# Patient Record
Sex: MALE | Race: WHITE | NOT HISPANIC OR LATINO | Employment: OTHER | URBAN - METROPOLITAN AREA
[De-identification: names, ages, dates, MRNs, and addresses within clinical notes are randomized per-mention and may not be internally consistent; named-entity substitution may affect disease eponyms.]

---

## 2017-01-10 ENCOUNTER — HISTORICAL (OUTPATIENT)
Dept: ADMINISTRATIVE | Facility: HOSPITAL | Age: 77
End: 2017-01-10

## 2017-02-14 ENCOUNTER — HISTORICAL (OUTPATIENT)
Dept: SURGERY | Facility: HOSPITAL | Age: 77
End: 2017-02-14

## 2017-03-17 ENCOUNTER — HISTORICAL (OUTPATIENT)
Dept: ADMINISTRATIVE | Facility: HOSPITAL | Age: 77
End: 2017-03-17

## 2023-02-28 ENCOUNTER — HOSPITAL ENCOUNTER (OUTPATIENT)
Dept: CARDIOLOGY | Facility: HOSPITAL | Age: 83
Discharge: HOME | End: 2023-02-28
Attending: INTERNAL MEDICINE
Payer: MEDICARE

## 2023-02-28 ENCOUNTER — ANESTHESIA (OUTPATIENT)
Dept: CARDIOLOGY | Facility: HOSPITAL | Age: 83
End: 2023-02-28
Payer: MEDICARE

## 2023-02-28 ENCOUNTER — ANESTHESIA EVENT (OUTPATIENT)
Dept: CARDIOLOGY | Facility: HOSPITAL | Age: 83
End: 2023-02-28
Payer: MEDICARE

## 2023-02-28 VITALS
OXYGEN SATURATION: 98 % | DIASTOLIC BLOOD PRESSURE: 85 MMHG | SYSTOLIC BLOOD PRESSURE: 189 MMHG | BODY MASS INDEX: 25.34 KG/M2 | RESPIRATION RATE: 15 BRPM | HEART RATE: 52 BPM | WEIGHT: 181 LBS | HEIGHT: 71 IN

## 2023-02-28 DIAGNOSIS — I48.0 PAROXYSMAL ATRIAL FIBRILLATION: Primary | ICD-10-CM

## 2023-02-28 DIAGNOSIS — I49.5 SSS (SICK SINUS SYNDROME): ICD-10-CM

## 2023-02-28 DIAGNOSIS — I48.0 PAF (PAROXYSMAL ATRIAL FIBRILLATION): ICD-10-CM

## 2023-02-28 DIAGNOSIS — I48.0 PAROXYSMAL ATRIAL FIBRILLATION: ICD-10-CM

## 2023-02-28 LAB — BSA FOR ECHO PROCEDURE: 2.03 M2

## 2023-02-28 PROCEDURE — 93312 ECHO TRANSESOPHAGEAL: CPT

## 2023-02-28 PROCEDURE — 93005 ELECTROCARDIOGRAM TRACING: CPT

## 2023-02-28 PROCEDURE — 25000003 PHARM REV CODE 250

## 2023-02-28 PROCEDURE — 93010 ELECTROCARDIOGRAM REPORT: CPT | Mod: ,,, | Performed by: INTERNAL MEDICINE

## 2023-02-28 PROCEDURE — 37000008 HC ANESTHESIA 1ST 15 MINUTES

## 2023-02-28 PROCEDURE — 93010 EKG 12-LEAD: ICD-10-PCS | Mod: ,,, | Performed by: INTERNAL MEDICINE

## 2023-02-28 PROCEDURE — 37000009 HC ANESTHESIA EA ADD 15 MINS

## 2023-02-28 PROCEDURE — 63600175 PHARM REV CODE 636 W HCPCS

## 2023-02-28 RX ORDER — EMPAGLIFLOZIN 10 MG/1
10 TABLET, FILM COATED ORAL NIGHTLY
COMMUNITY
Start: 2023-01-19 | End: 2023-08-24 | Stop reason: SDUPTHER

## 2023-02-28 RX ORDER — ATORVASTATIN CALCIUM 40 MG/1
40 TABLET, FILM COATED ORAL NIGHTLY
COMMUNITY
End: 2023-08-15 | Stop reason: ALTCHOICE

## 2023-02-28 RX ORDER — PRAMIPEXOLE DIHYDROCHLORIDE 0.5 MG/1
.5-1.5 TABLET ORAL NIGHTLY
COMMUNITY
Start: 2023-02-11 | End: 2023-06-29 | Stop reason: SDUPTHER

## 2023-02-28 RX ORDER — LIDOCAINE HYDROCHLORIDE 20 MG/ML
INJECTION, SOLUTION EPIDURAL; INFILTRATION; INTRACAUDAL; PERINEURAL
Status: DISCONTINUED | OUTPATIENT
Start: 2023-02-28 | End: 2023-02-28

## 2023-02-28 RX ORDER — ALPRAZOLAM 0.5 MG/1
0.5 TABLET ORAL NIGHTLY PRN
COMMUNITY
Start: 2023-02-08 | End: 2023-06-19 | Stop reason: SDUPTHER

## 2023-02-28 RX ORDER — PROPOFOL 10 MG/ML
VIAL (ML) INTRAVENOUS
Status: DISCONTINUED | OUTPATIENT
Start: 2023-02-28 | End: 2023-02-28

## 2023-02-28 RX ADMIN — PROPOFOL 40 MG: 10 INJECTION, EMULSION INTRAVENOUS at 01:02

## 2023-02-28 RX ADMIN — PROPOFOL 20 MG: 10 INJECTION, EMULSION INTRAVENOUS at 01:02

## 2023-02-28 RX ADMIN — LIDOCAINE HYDROCHLORIDE 80 MG: 20 INJECTION, SOLUTION EPIDURAL; INFILTRATION; INTRACAUDAL; PERINEURAL at 01:02

## 2023-02-28 RX ADMIN — SODIUM CHLORIDE, SODIUM GLUCONATE, SODIUM ACETATE, POTASSIUM CHLORIDE AND MAGNESIUM CHLORIDE: 526; 502; 368; 37; 30 INJECTION, SOLUTION INTRAVENOUS at 12:02

## 2023-02-28 RX ADMIN — PROPOFOL 50 MG: 10 INJECTION, EMULSION INTRAVENOUS at 01:02

## 2023-02-28 NOTE — DISCHARGE SUMMARY
[facility] - Cath Lab Services  Discharge Note  Short Stay    Transesophageal echo (DELORIS)      OUTCOME: Patient tolerated treatment/procedure well without complication and is now ready for discharge.    DISPOSITION: Home or Self Care    FINAL DIAGNOSIS:  PAF    FOLLOWUP: In clinic    DISCHARGE INSTRUCTIONS:  No discharge procedures on file.     TIME SPENT ON DISCHARGE: 15 minutes

## 2023-02-28 NOTE — ANESTHESIA POSTPROCEDURE EVALUATION
Anesthesia Post Evaluation    Patient: Rico Nagel    Procedure(s) Performed: * No procedures listed *    Final Anesthesia Type: general      Patient location during evaluation: PACU  Patient participation: Yes- Able to Participate  Level of consciousness: awake and alert  Post-procedure vital signs: reviewed and stable  Pain management: adequate  Airway patency: patent      Anesthetic complications: no      Cardiovascular status: hemodynamically stable  Respiratory status: unassisted  Hydration status: euvolemic  Follow-up not needed.          Vitals Value Taken Time   /102 02/28/23 1331   Temp 36.7 02/28/23 1342   Pulse 56 02/28/23 1341   Resp 18 02/28/23 1341   SpO2 97 % 02/28/23 1341   Vitals shown include unvalidated device data.      No case tracking events are documented in the log.      Pain/Mica Score: No data recorded

## 2023-02-28 NOTE — ANESTHESIA PREPROCEDURE EVALUATION
02/28/2023  Rico Nagel is a 82 y.o., male.      Pre-op Assessment    I have reviewed the Patient Summary Reports.     I have reviewed the Nursing Notes. I have reviewed the NPO Status.   I have reviewed the Medications.     Review of Systems      Physical Exam  General: Well nourished, Cooperative, Alert and Oriented    Airway:  Mallampati: II   Mouth Opening: Normal  TM Distance: Normal  Tongue: Normal  Neck ROM: Normal ROM    Dental:  Intact        Anesthesia Plan  Type of Anesthesia, risks & benefits discussed:    Anesthesia Type: Gen Natural Airway  Intra-op Monitoring Plan: Standard ASA Monitors  Post Op Pain Control Plan: multimodal analgesia  Induction:  IV  Informed Consent: Informed consent signed with the Patient and all parties understand the risks and agree with anesthesia plan.  All questions answered. Patient consented to blood products? Yes  ASA Score: 3  Day of Surgery Review of History & Physical: H&P Update referred to the surgeon/provider.    Ready For Surgery From Anesthesia Perspective.     .

## 2023-02-28 NOTE — TRANSFER OF CARE
"Anesthesia Transfer of Care Note    Patient: Rico Nagel    Procedure(s) Performed: * No procedures listed *    Patient location: Cath Lab    Anesthesia Type: general    Transport from OR: Transported from OR on room air with adequate spontaneous ventilation    Post pain: adequate analgesia    Post assessment: no apparent anesthetic complications    Post vital signs: stable    Level of consciousness: awake and alert    Nausea/Vomiting: no nausea/vomiting    Complications: none    Transfer of care protocol was followed      Last vitals:   Visit Vitals  Ht 5' 11" (1.803 m)   Wt 82.1 kg (181 lb)   BMI 25.24 kg/m²     "

## 2023-03-23 ENCOUNTER — ANESTHESIA EVENT (OUTPATIENT)
Dept: CARDIOLOGY | Facility: HOSPITAL | Age: 83
DRG: 274 | End: 2023-03-23
Payer: MEDICARE

## 2023-03-23 RX ORDER — LANOLIN ALCOHOL/MO/W.PET/CERES
1 CREAM (GRAM) TOPICAL
COMMUNITY

## 2023-03-23 RX ORDER — PANTOPRAZOLE SODIUM 40 MG/1
40 TABLET, DELAYED RELEASE ORAL DAILY
Status: ON HOLD | COMMUNITY
Start: 2023-02-27 | End: 2023-03-29 | Stop reason: SDUPTHER

## 2023-03-27 ENCOUNTER — HOSPITAL ENCOUNTER (OUTPATIENT)
Dept: RADIOLOGY | Facility: HOSPITAL | Age: 83
Discharge: HOME | DRG: 274 | End: 2023-03-27
Attending: INTERNAL MEDICINE
Payer: MEDICARE

## 2023-03-27 DIAGNOSIS — Z00.6 EXAMINATION OF PARTICIPANT IN CLINICAL TRIAL: ICD-10-CM

## 2023-03-27 DIAGNOSIS — I48.0 PAROXYSMAL ATRIAL FIBRILLATION: ICD-10-CM

## 2023-03-27 PROCEDURE — 71046 X-RAY EXAM CHEST 2 VIEWS: CPT | Mod: TC

## 2023-03-27 PROCEDURE — 86923 COMPATIBILITY TEST ELECTRIC: CPT | Mod: 91 | Performed by: INTERNAL MEDICINE

## 2023-03-27 NOTE — ANESTHESIA PREPROCEDURE EVALUATION
03/27/2023  Rico Nagel is a 82 y.o., male       Pre-op Assessment    I have reviewed the Patient Summary Reports.     I have reviewed the Nursing Notes. I have reviewed the NPO Status.   I have reviewed the Medications.     Review of Systems  Anesthesia Hx:  No problems with previous Anesthesia    Social:  Non-Smoker    Cardiovascular:   CAD  Dysrhythmias atrial fibrillation    Endocrine:   Diabetes        Physical Exam  General: Well nourished, Cooperative, Alert and Oriented    Airway:  Mallampati: II / II  Mouth Opening: Normal  TM Distance: Normal  Tongue: Normal  Neck ROM: Extension Decreased    Dental:  Intact, Partial Dentures  Small lower molar bridge      Anesthesia Plan  Type of Anesthesia, risks & benefits discussed:    Anesthesia Type: Gen ETT  Intra-op Monitoring Plan: Standard ASA Monitors and Art Line  Post Op Pain Control Plan: multimodal analgesia and IV/PO Opioids PRN  Airway Plan: Direct, Post-Induction  Informed Consent: Informed consent signed with the Patient and all parties understand the risks and agree with anesthesia plan.  All questions answered. Patient consented to blood products? Yes  ASA Score: 3  Day of Surgery Review of History & Physical: H&P Update referred to the surgeon/provider.    Ready For Surgery From Anesthesia Perspective.     .

## 2023-03-28 ENCOUNTER — HOSPITAL ENCOUNTER (INPATIENT)
Facility: HOSPITAL | Age: 83
LOS: 1 days | Discharge: HOME | DRG: 274 | End: 2023-03-29
Attending: INTERNAL MEDICINE | Admitting: INTERNAL MEDICINE
Payer: MEDICARE

## 2023-03-28 ENCOUNTER — ANESTHESIA (OUTPATIENT)
Dept: CARDIOLOGY | Facility: HOSPITAL | Age: 83
DRG: 274 | End: 2023-03-28
Payer: MEDICARE

## 2023-03-28 DIAGNOSIS — I48.91 A-FIB: ICD-10-CM

## 2023-03-28 DIAGNOSIS — I48.91 ATRIAL FIBRILLATION: ICD-10-CM

## 2023-03-28 DIAGNOSIS — I48.0 PAF (PAROXYSMAL ATRIAL FIBRILLATION): Primary | ICD-10-CM

## 2023-03-28 DIAGNOSIS — I48.0 PAROXYSMAL ATRIAL FIBRILLATION: ICD-10-CM

## 2023-03-28 DIAGNOSIS — I48.20 ATRIAL FIBRILLATION, CHRONIC: ICD-10-CM

## 2023-03-28 DIAGNOSIS — R00.1 BRADYCARDIA: ICD-10-CM

## 2023-03-28 LAB
BSA FOR ECHO PROCEDURE: 2.04 M2
GLUCOSE SERPL-MCNC: 124 MG/DL (ref 70–110)
POCT GLUCOSE: 124 MG/DL (ref 70–110)
POCT GLUCOSE: 128 MG/DL (ref 70–110)

## 2023-03-28 PROCEDURE — 93010 ELECTROCARDIOGRAM REPORT: CPT | Mod: ,,, | Performed by: INTERNAL MEDICINE

## 2023-03-28 PROCEDURE — C1894 INTRO/SHEATH, NON-LASER: HCPCS | Performed by: INTERNAL MEDICINE

## 2023-03-28 PROCEDURE — 37000008 HC ANESTHESIA 1ST 15 MINUTES: Performed by: INTERNAL MEDICINE

## 2023-03-28 PROCEDURE — 21400001 HC TELEMETRY ROOM

## 2023-03-28 PROCEDURE — 63600175 PHARM REV CODE 636 W HCPCS: Performed by: INTERNAL MEDICINE

## 2023-03-28 PROCEDURE — 63600175 PHARM REV CODE 636 W HCPCS: Performed by: ANESTHESIOLOGY

## 2023-03-28 PROCEDURE — 37000009 HC ANESTHESIA EA ADD 15 MINS: Performed by: INTERNAL MEDICINE

## 2023-03-28 PROCEDURE — 25000003 PHARM REV CODE 250: Performed by: INTERNAL MEDICINE

## 2023-03-28 PROCEDURE — 11000001 HC ACUTE MED/SURG PRIVATE ROOM

## 2023-03-28 PROCEDURE — 33340 PERQ CLSR TCAT L ATR APNDGE: CPT | Mod: Q0 | Performed by: INTERNAL MEDICINE

## 2023-03-28 PROCEDURE — 25500020 PHARM REV CODE 255: Performed by: INTERNAL MEDICINE

## 2023-03-28 PROCEDURE — 63600175 PHARM REV CODE 636 W HCPCS: Performed by: STUDENT IN AN ORGANIZED HEALTH CARE EDUCATION/TRAINING PROGRAM

## 2023-03-28 PROCEDURE — 27201423 OPTIME MED/SURG SUP & DEVICES STERILE SUPPLY: Performed by: INTERNAL MEDICINE

## 2023-03-28 PROCEDURE — 25000003 PHARM REV CODE 250: Performed by: STUDENT IN AN ORGANIZED HEALTH CARE EDUCATION/TRAINING PROGRAM

## 2023-03-28 PROCEDURE — 93005 ELECTROCARDIOGRAM TRACING: CPT

## 2023-03-28 PROCEDURE — 63600175 PHARM REV CODE 636 W HCPCS: Performed by: NURSE PRACTITIONER

## 2023-03-28 PROCEDURE — 93010 EKG 12-LEAD: ICD-10-PCS | Mod: ,,, | Performed by: INTERNAL MEDICINE

## 2023-03-28 DEVICE — LEFT ATRIAL APPENDAGE OCCLUDER
Type: IMPLANTABLE DEVICE | Site: HEART | Status: FUNCTIONAL
Brand: AMPLATZER™ AMULET™

## 2023-03-28 RX ORDER — HEPARIN SODIUM 10000 [USP'U]/100ML
INJECTION, SOLUTION INTRAVENOUS CONTINUOUS PRN
Status: DISCONTINUED | OUTPATIENT
Start: 2023-03-28 | End: 2023-03-28

## 2023-03-28 RX ORDER — CLOPIDOGREL BISULFATE 75 MG/1
75 TABLET ORAL DAILY
Status: DISCONTINUED | OUTPATIENT
Start: 2023-03-29 | End: 2023-03-29 | Stop reason: HOSPADM

## 2023-03-28 RX ORDER — DEXAMETHASONE SODIUM PHOSPHATE 4 MG/ML
INJECTION, SOLUTION INTRA-ARTICULAR; INTRALESIONAL; INTRAMUSCULAR; INTRAVENOUS; SOFT TISSUE
Status: DISCONTINUED | OUTPATIENT
Start: 2023-03-28 | End: 2023-03-28

## 2023-03-28 RX ORDER — HYDRALAZINE HYDROCHLORIDE 20 MG/ML
10 INJECTION INTRAMUSCULAR; INTRAVENOUS
Status: DISCONTINUED | OUTPATIENT
Start: 2023-03-28 | End: 2023-03-29 | Stop reason: HOSPADM

## 2023-03-28 RX ORDER — CEFAZOLIN SODIUM 1 G/3ML
1 INJECTION, POWDER, FOR SOLUTION INTRAMUSCULAR; INTRAVENOUS
Status: COMPLETED | OUTPATIENT
Start: 2023-03-28 | End: 2023-03-28

## 2023-03-28 RX ORDER — LIDOCAINE HYDROCHLORIDE 20 MG/ML
INJECTION INTRAVENOUS
Status: DISCONTINUED | OUTPATIENT
Start: 2023-03-28 | End: 2023-03-28

## 2023-03-28 RX ORDER — FENTANYL CITRATE 50 UG/ML
INJECTION, SOLUTION INTRAMUSCULAR; INTRAVENOUS
Status: DISCONTINUED | OUTPATIENT
Start: 2023-03-28 | End: 2023-03-28

## 2023-03-28 RX ORDER — ASPIRIN 81 MG/1
81 TABLET ORAL DAILY
Status: DISCONTINUED | OUTPATIENT
Start: 2023-03-29 | End: 2023-03-29 | Stop reason: HOSPADM

## 2023-03-28 RX ORDER — LIDOCAINE HYDROCHLORIDE 10 MG/ML
INJECTION INFILTRATION; PERINEURAL
Status: DISCONTINUED | OUTPATIENT
Start: 2023-03-28 | End: 2023-03-28

## 2023-03-28 RX ORDER — CEFAZOLIN SODIUM 1 G/3ML
1 INJECTION, POWDER, FOR SOLUTION INTRAMUSCULAR; INTRAVENOUS
Status: DISCONTINUED | OUTPATIENT
Start: 2023-03-28 | End: 2023-03-28

## 2023-03-28 RX ORDER — HYDROCODONE BITARTRATE AND ACETAMINOPHEN 5; 325 MG/1; MG/1
1 TABLET ORAL EVERY 4 HOURS PRN
Status: DISCONTINUED | OUTPATIENT
Start: 2023-03-28 | End: 2023-03-29 | Stop reason: HOSPADM

## 2023-03-28 RX ORDER — ACETAMINOPHEN 325 MG/1
650 TABLET ORAL EVERY 4 HOURS PRN
Status: DISCONTINUED | OUTPATIENT
Start: 2023-03-28 | End: 2023-03-29 | Stop reason: HOSPADM

## 2023-03-28 RX ORDER — HEPARIN SODIUM 1000 [USP'U]/ML
INJECTION, SOLUTION INTRAVENOUS; SUBCUTANEOUS
Status: DISCONTINUED | OUTPATIENT
Start: 2023-03-28 | End: 2023-03-28

## 2023-03-28 RX ORDER — HYDRALAZINE HYDROCHLORIDE 20 MG/ML
10 INJECTION INTRAMUSCULAR; INTRAVENOUS ONCE
Status: COMPLETED | OUTPATIENT
Start: 2023-03-28 | End: 2023-03-28

## 2023-03-28 RX ORDER — PROTAMINE SULFATE 10 MG/ML
INJECTION, SOLUTION INTRAVENOUS
Status: DISCONTINUED | OUTPATIENT
Start: 2023-03-28 | End: 2023-03-28

## 2023-03-28 RX ORDER — MUPIROCIN 20 MG/G
OINTMENT TOPICAL DAILY
Status: DISCONTINUED | OUTPATIENT
Start: 2023-03-28 | End: 2023-03-29 | Stop reason: HOSPADM

## 2023-03-28 RX ORDER — MORPHINE SULFATE 4 MG/ML
2 INJECTION, SOLUTION INTRAMUSCULAR; INTRAVENOUS EVERY 4 HOURS PRN
Status: DISCONTINUED | OUTPATIENT
Start: 2023-03-28 | End: 2023-03-29 | Stop reason: HOSPADM

## 2023-03-28 RX ORDER — ALPRAZOLAM 0.5 MG/1
0.5 TABLET ORAL NIGHTLY PRN
Status: DISCONTINUED | OUTPATIENT
Start: 2023-03-28 | End: 2023-03-29 | Stop reason: HOSPADM

## 2023-03-28 RX ORDER — SODIUM CHLORIDE 0.9 % (FLUSH) 0.9 %
10 SYRINGE (ML) INJECTION
Status: DISCONTINUED | OUTPATIENT
Start: 2023-03-28 | End: 2023-03-28

## 2023-03-28 RX ORDER — ROCURONIUM BROMIDE 10 MG/ML
INJECTION, SOLUTION INTRAVENOUS
Status: DISCONTINUED | OUTPATIENT
Start: 2023-03-28 | End: 2023-03-28

## 2023-03-28 RX ORDER — SODIUM CHLORIDE 9 MG/ML
INJECTION, SOLUTION INTRAVENOUS ONCE
Status: DISCONTINUED | OUTPATIENT
Start: 2023-03-28 | End: 2023-03-28

## 2023-03-28 RX ORDER — PROPOFOL 10 MG/ML
VIAL (ML) INTRAVENOUS
Status: DISCONTINUED | OUTPATIENT
Start: 2023-03-28 | End: 2023-03-28

## 2023-03-28 RX ORDER — ATORVASTATIN CALCIUM 40 MG/1
40 TABLET, FILM COATED ORAL NIGHTLY
Status: DISCONTINUED | OUTPATIENT
Start: 2023-03-28 | End: 2023-03-29 | Stop reason: HOSPADM

## 2023-03-28 RX ORDER — DOXYCYCLINE HYCLATE 100 MG
100 TABLET ORAL 2 TIMES DAILY
Status: DISCONTINUED | OUTPATIENT
Start: 2023-03-29 | End: 2023-03-29 | Stop reason: HOSPADM

## 2023-03-28 RX ORDER — HYDRALAZINE HYDROCHLORIDE 20 MG/ML
INJECTION INTRAMUSCULAR; INTRAVENOUS
Status: DISPENSED
Start: 2023-03-28 | End: 2023-03-29

## 2023-03-28 RX ORDER — ONDANSETRON 2 MG/ML
INJECTION INTRAMUSCULAR; INTRAVENOUS
Status: DISCONTINUED | OUTPATIENT
Start: 2023-03-28 | End: 2023-03-28

## 2023-03-28 RX ADMIN — MUPIROCIN: 20 OINTMENT TOPICAL at 09:03

## 2023-03-28 RX ADMIN — SUGAMMADEX 200 MG: 100 INJECTION, SOLUTION INTRAVENOUS at 10:03

## 2023-03-28 RX ADMIN — SODIUM CHLORIDE, SODIUM GLUCONATE, SODIUM ACETATE, POTASSIUM CHLORIDE AND MAGNESIUM CHLORIDE: 526; 502; 368; 37; 30 INJECTION, SOLUTION INTRAVENOUS at 10:03

## 2023-03-28 RX ADMIN — HEPARIN SODIUM 1000 UNITS: 1000 INJECTION, SOLUTION INTRAVENOUS; SUBCUTANEOUS at 10:03

## 2023-03-28 RX ADMIN — ROCURONIUM BROMIDE 60 MG: 10 SOLUTION INTRAVENOUS at 09:03

## 2023-03-28 RX ADMIN — FENTANYL CITRATE 50 MCG: 50 INJECTION, SOLUTION INTRAMUSCULAR; INTRAVENOUS at 09:03

## 2023-03-28 RX ADMIN — PROTAMINE SULFATE 40 MG: 10 INJECTION, SOLUTION INTRAVENOUS at 10:03

## 2023-03-28 RX ADMIN — ATORVASTATIN CALCIUM 40 MG: 40 TABLET, FILM COATED ORAL at 09:03

## 2023-03-28 RX ADMIN — HYDRALAZINE HYDROCHLORIDE 10 MG: 20 INJECTION INTRAMUSCULAR; INTRAVENOUS at 12:03

## 2023-03-28 RX ADMIN — SODIUM CHLORIDE, SODIUM GLUCONATE, SODIUM ACETATE, POTASSIUM CHLORIDE AND MAGNESIUM CHLORIDE: 526; 502; 368; 37; 30 INJECTION, SOLUTION INTRAVENOUS at 09:03

## 2023-03-28 RX ADMIN — DEXAMETHASONE SODIUM PHOSPHATE 4 MG: 4 INJECTION, SOLUTION INTRA-ARTICULAR; INTRALESIONAL; INTRAMUSCULAR; INTRAVENOUS; SOFT TISSUE at 10:03

## 2023-03-28 RX ADMIN — ONDANSETRON 4 MG: 2 INJECTION INTRAMUSCULAR; INTRAVENOUS at 10:03

## 2023-03-28 RX ADMIN — CEFAZOLIN 2 G: 1 INJECTION, POWDER, FOR SOLUTION INTRAMUSCULAR; INTRAVENOUS at 09:03

## 2023-03-28 RX ADMIN — HEPARIN SODIUM 1000 UNITS/HR: 10000 INJECTION, SOLUTION INTRAVENOUS at 10:03

## 2023-03-28 RX ADMIN — LIDOCAINE HYDROCHLORIDE 80 MG: 20 INJECTION, SOLUTION INTRAVENOUS at 09:03

## 2023-03-28 RX ADMIN — HYDRALAZINE HYDROCHLORIDE 10 MG: 20 INJECTION INTRAMUSCULAR; INTRAVENOUS at 09:03

## 2023-03-28 RX ADMIN — PROPOFOL 160 MG: 10 INJECTION, EMULSION INTRAVENOUS at 09:03

## 2023-03-28 RX ADMIN — HYDRALAZINE HYDROCHLORIDE 10 MG: 20 INJECTION INTRAMUSCULAR; INTRAVENOUS at 01:03

## 2023-03-28 RX ADMIN — HEPARIN SODIUM 4000 UNITS: 1000 INJECTION, SOLUTION INTRAVENOUS; SUBCUTANEOUS at 10:03

## 2023-03-28 NOTE — ANESTHESIA PROCEDURE NOTES
Intubation    Date/Time: 3/28/2023 9:53 AM  Performed by: Vance Erickson CRNA  Authorized by: Mallorie Hendricks MD     Intubation:     Induction:  Intravenous    Intubated:  Postinduction    Mask Ventilation:  Easy mask    Attempts:  1    Attempted By:  CRNA    Method of Intubation:  Direct    Blade:  Odonnell 4    Laryngeal View Grade: Grade IIA - cords partially seen      Difficult Airway Encountered?: No      Complications:  None    Airway Device:  Oral endotracheal tube    Airway Device Size:  7.5    Style/Cuff Inflation:  Cuffed (inflated to minimal occlusive pressure)    Tube secured:  22    Secured at:  The lips    Placement Verified By:  Capnometry    Complicating Factors:  None    Findings Post-Intubation:  BS equal bilateral and atraumatic/condition of teeth unchanged

## 2023-03-28 NOTE — Clinical Note
DEVICE AMULET 22MM was inserted into the left atrial appendage. Patient sent in for K of 6.4 and chest pain

## 2023-03-28 NOTE — TRANSFER OF CARE
"Anesthesia Transfer of Care Note    Patient: Rico Nagel    Procedure(s) Performed: Procedure(s) (LRB):  Left atrial appendage closure device (N/A)  Transesophageal echo (DELORIS) intra-procedure log documentation (N/A)    Patient location: PACU    Anesthesia Type: general    Transport from OR: Transported from OR on room air with adequate spontaneous ventilation    Post pain: adequate analgesia    Post assessment: no apparent anesthetic complications    Post vital signs: stable    Level of consciousness: awake    Nausea/Vomiting: no nausea/vomiting    Complications: none    Transfer of care protocol was followed      Last vitals:   Visit Vitals  BP (!) 154/72 (BP Location: Left arm, Patient Position: Lying)   Pulse (!) 51   Temp 36.9 °C (98.5 °F) (Oral)   Resp 19   Ht 5' 11" (1.803 m)   Wt 83 kg (182 lb 15.7 oz)   SpO2 95%   BMI 25.52 kg/m²     "

## 2023-03-28 NOTE — Clinical Note
A venogram was performed in the [facility]. The vessel was injected via hand injection  with 18 mL of contrast.

## 2023-03-28 NOTE — Clinical Note
A venogram was performed in the [facility]. The vessel was injected via hand injection  with 17 mL of contrast.

## 2023-03-28 NOTE — ANESTHESIA PROCEDURE NOTES
Arterial    Diagnosis: afib    Patient location during procedure: done in OR  Procedure start time: 3/28/2023 9:54 AM  Timeout: 3/28/2023 9:53 AM  Procedure end time: 3/28/2023 9:54 AM    Staffing  Authorizing Provider: Mallorie Hendricks MD  Performing Provider: Vance Erickson CRNA    Anesthesiologist was present at the time of the procedure.    Preanesthetic Checklist  Completed: patient identified, IV checked, site marked, risks and benefits discussed, surgical consent, monitors and equipment checked, pre-op evaluation, timeout performed and anesthesia consent givenArterial  Skin Prep: chlorhexidine gluconate  Local Infiltration: none  Orientation: left  Location: radial    Catheter Size: 20 G  Catheter placement by Anatomical landmarks. Heme positive aspiration all ports. Insertion Attempts: 1  Assessment  Dressing: secured with tape and tegaderm  Patient: Tolerated well

## 2023-03-28 NOTE — ANESTHESIA PROCEDURE NOTES
Monitor DELORIS    Diagnosis: Atrial fibrillation  Patient location during procedure: OR    Staffing  Authorizing Provider: Mallorie Hendricks MD  Performing Provider: Mallorie Hendricks MD    Staffing  Anesthesiologist Present  Yes      After induction of general endotracheal anesthesia in the operating room, transesophageal echo probe was placed atraumatically and the esophagus.    1. Real-time echo guidance was used for evaluation of patency of the left atrial appendage, measurements and placement of a left atrial occluding device, ambulate.      2. Right ventricular function appears normal.  Patient is relatively hypovolemic as right atrium and right ventricle are hypovolemic.  Left ventricle has low normal ejection fraction.    3. No spontaneous echo contrast or thrombus are noted in the left atrial appendage, adequate measurements were determined.  Initially an ambulance device was deployed which was felt to have poor seeding and some superiorly noted Doppler flow.  This device was removed and a smaller device placed more distally into the left atrial appendage with good occlusion and no Doppler flow around the device.  A tug test was performed with no displacement of the device.      At the conclusion of the operation the transesophageal echo probe was removed atraumatically.

## 2023-03-29 VITALS
BODY MASS INDEX: 25.62 KG/M2 | WEIGHT: 183 LBS | SYSTOLIC BLOOD PRESSURE: 188 MMHG | RESPIRATION RATE: 18 BRPM | HEART RATE: 61 BPM | TEMPERATURE: 98 F | HEIGHT: 71 IN | OXYGEN SATURATION: 97 % | DIASTOLIC BLOOD PRESSURE: 69 MMHG

## 2023-03-29 LAB
ANION GAP SERPL CALC-SCNC: 6 MEQ/L
BASOPHILS # BLD AUTO: 0.01 X10(3)/MCL (ref 0–0.2)
BASOPHILS NFR BLD AUTO: 0.1 %
BSA FOR ECHO PROCEDURE: 2.04 M2
BUN SERPL-MCNC: 23.7 MG/DL (ref 8.4–25.7)
CALCIUM SERPL-MCNC: 8.8 MG/DL (ref 8.8–10)
CHLORIDE SERPL-SCNC: 107 MMOL/L (ref 98–107)
CO2 SERPL-SCNC: 29 MMOL/L (ref 23–31)
CREAT SERPL-MCNC: 0.76 MG/DL (ref 0.73–1.18)
CREAT/UREA NIT SERPL: 31
EOSINOPHIL # BLD AUTO: 0.01 X10(3)/MCL (ref 0–0.9)
EOSINOPHIL NFR BLD AUTO: 0.1 %
ERYTHROCYTE [DISTWIDTH] IN BLOOD BY AUTOMATED COUNT: 15.1 % (ref 11.5–17)
GFR SERPLBLD CREATININE-BSD FMLA CKD-EPI: >60 MLS/MIN/1.73/M2
GLUCOSE SERPL-MCNC: 183 MG/DL (ref 82–115)
HCT VFR BLD AUTO: 42 % (ref 42–52)
HGB BLD-MCNC: 13.6 G/DL (ref 14–18)
IMM GRANULOCYTES # BLD AUTO: 0.03 X10(3)/MCL (ref 0–0.04)
IMM GRANULOCYTES NFR BLD AUTO: 0.3 %
LYMPHOCYTES # BLD AUTO: 1.15 X10(3)/MCL (ref 0.6–4.6)
LYMPHOCYTES NFR BLD AUTO: 12.6 %
MCH RBC QN AUTO: 30.7 PG (ref 27–31)
MCHC RBC AUTO-ENTMCNC: 32.4 G/DL (ref 33–36)
MCV RBC AUTO: 94.8 FL (ref 80–94)
MONOCYTES # BLD AUTO: 0.82 X10(3)/MCL (ref 0.1–1.3)
MONOCYTES NFR BLD AUTO: 9 %
NEUTROPHILS # BLD AUTO: 7.14 X10(3)/MCL (ref 2.1–9.2)
NEUTROPHILS NFR BLD AUTO: 77.9 %
NRBC BLD AUTO-RTO: 0 %
PLATELET # BLD AUTO: 169 X10(3)/MCL (ref 130–400)
PMV BLD AUTO: 9.8 FL (ref 7.4–10.4)
POTASSIUM SERPL-SCNC: 3.9 MMOL/L (ref 3.5–5.1)
RBC # BLD AUTO: 4.43 X10(6)/MCL (ref 4.7–6.1)
SODIUM SERPL-SCNC: 142 MMOL/L (ref 136–145)
TRICUSPID ANNULAR PLANE SYSTOLIC EXCURSION: 2.38 CM
WBC # SPEC AUTO: 9.2 X10(3)/MCL (ref 4.5–11.5)

## 2023-03-29 PROCEDURE — 25000003 PHARM REV CODE 250

## 2023-03-29 PROCEDURE — 80048 BASIC METABOLIC PNL TOTAL CA: CPT | Performed by: INTERNAL MEDICINE

## 2023-03-29 PROCEDURE — 93010 ELECTROCARDIOGRAM REPORT: CPT | Mod: ,,, | Performed by: INTERNAL MEDICINE

## 2023-03-29 PROCEDURE — 93005 ELECTROCARDIOGRAM TRACING: CPT

## 2023-03-29 PROCEDURE — 93010 EKG 12-LEAD: ICD-10-PCS | Mod: ,,, | Performed by: INTERNAL MEDICINE

## 2023-03-29 PROCEDURE — 25000003 PHARM REV CODE 250: Performed by: INTERNAL MEDICINE

## 2023-03-29 PROCEDURE — 85025 COMPLETE CBC W/AUTO DIFF WBC: CPT | Performed by: INTERNAL MEDICINE

## 2023-03-29 RX ORDER — AMLODIPINE BESYLATE 5 MG/1
5 TABLET ORAL DAILY
Qty: 30 TABLET | Refills: 3 | Status: SHIPPED | OUTPATIENT
Start: 2023-03-30 | End: 2023-06-15

## 2023-03-29 RX ORDER — AMLODIPINE BESYLATE 5 MG/1
5 TABLET ORAL DAILY
Status: DISCONTINUED | OUTPATIENT
Start: 2023-03-29 | End: 2023-03-29 | Stop reason: HOSPADM

## 2023-03-29 RX ORDER — PANTOPRAZOLE SODIUM 40 MG/1
40 TABLET, DELAYED RELEASE ORAL DAILY
Qty: 30 TABLET | Refills: 3 | Status: SHIPPED | OUTPATIENT
Start: 2023-03-29

## 2023-03-29 RX ORDER — DOXYCYCLINE HYCLATE 100 MG
100 TABLET ORAL 2 TIMES DAILY
Qty: 5 TABLET | Refills: 0 | Status: SHIPPED | OUTPATIENT
Start: 2023-03-29 | End: 2023-06-15

## 2023-03-29 RX ORDER — CLOPIDOGREL BISULFATE 75 MG/1
75 TABLET ORAL DAILY
Qty: 30 TABLET | Refills: 6 | Status: SHIPPED | OUTPATIENT
Start: 2023-03-29 | End: 2023-12-19

## 2023-03-29 RX ORDER — ASPIRIN 81 MG/1
81 TABLET ORAL DAILY
Qty: 30 TABLET | Refills: 6 | Status: SHIPPED | OUTPATIENT
Start: 2023-03-29 | End: 2024-03-28

## 2023-03-29 RX ADMIN — MUPIROCIN: 20 OINTMENT TOPICAL at 09:03

## 2023-03-29 RX ADMIN — CLOPIDOGREL BISULFATE 75 MG: 75 TABLET ORAL at 09:03

## 2023-03-29 RX ADMIN — DOXYCYCLINE HYCLATE 100 MG: 100 TABLET, COATED ORAL at 09:03

## 2023-03-29 RX ADMIN — ASPIRIN 81 MG: 81 TABLET, COATED ORAL at 09:03

## 2023-03-29 RX ADMIN — AMLODIPINE BESYLATE 5 MG: 5 TABLET ORAL at 02:03

## 2023-03-29 NOTE — DISCHARGE SUMMARY
"[facility] - 6th Floor Medical Telemetry  Cardiology  Discharge Summary      Patient Name: Rico Nagel  MRN: [id number]  Admission Date: 3/28/2023  Hospital Length of Stay: 1 days  Discharge Date and Time:  03/29/2023 12:24 PM  Attending Physician: Ayo Colon MD  Discharging Provider: STEPHANIE Ponce  Primary Care Physician: Leon Vaughan Sr, MD    HPI:   Mr. Nagel is an 82 year old male who is known to [facility], Dr. Johnson and Dr. Colon. He presents to [facility] for a scheduled Amulet procedure s/t history of PAF and GIB. See procedure below.     Hospital course:   3.29.23: NAD. SB on tele (40's - 50's). Denies CP, SOB, or palps. "When can I go home?" Right groin benign.     Procedure(s) (LRB):  Left atrial appendage closure device (N/A)  Transesophageal echo (DELORIS) intra-procedure log documentation (N/A)   The estimated blood loss was <50 mL.  The left atrial appendage closure was successful with Amulet device.  The left atrial appendage closure was successful  with a DEVICE AMULET 22MM device.      Final Active Diagnoses:    Diagnosis Date Noted POA    PRINCIPAL PROBLEM:  PAF (paroxysmal atrial fibrillation) [I48.0] 02/28/2023 Yes      Problems Resolved During this Admission:       Discharged Condition: stable    Review of Systems   Cardiovascular:  Negative for chest pain and palpitations.   Respiratory:  Negative for shortness of breath.    All other systems reviewed and are negative.    Physical Exam  HENT:      Head: Normocephalic.      Nose: Nose normal.      Mouth/Throat:      Mouth: Mucous membranes are moist.   Eyes:      Extraocular Movements: Extraocular movements intact.   Cardiovascular:      Rate and Rhythm: Regular rhythm. Bradycardia present.   Pulmonary:      Effort: Pulmonary effort is normal.      Breath sounds: Normal breath sounds.   Abdominal:      Palpations: Abdomen is soft.   Skin:     General: Skin is warm and dry.   Neurological:      General: No focal deficit " present.      Mental Status: He is alert and oriented to person, place, and time.   Psychiatric:         Behavior: Behavior normal.       Disposition: Home or Self Care    Follow Up:   Follow-up Information       Ayo Colon MD Follow up on 4/5/2023.    Specialties: Cardiology, Pathology  Why: @ 11:20AM  Contact information:  [address] [place] [address]  [phone]               Leon Vaughan Sr, MD Follow up on 4/3/2023.    Specialty: Family Medicine  Why: @12:45  Contact information:  [address]   [address] [place] [address]  [phone]                           Patient Instructions:      Diet Cardiac     Notify your health care provider if you experience any of the following:  temperature >100.4     Notify your health care provider if you experience any of the following:  redness, tenderness, or signs of infection (pain, swelling, redness, odor or green/yellow discharge around incision site)     No dressing needed     Lifting restrictions   Order Comments: Nothing > 10 lbs x 3 days     No driving until:   Order Comments: Until cleared by Dr. Colon     Activity as tolerated     Medications:  Reconciled Home Medications:      Medication List        START taking these medications      aspirin 81 MG EC tablet  Commonly known as: ECOTRIN  Take 1 tablet (81 mg total) by mouth once daily.     clopidogreL 75 mg tablet  Commonly known as: PLAVIX  Take 1 tablet (75 mg total) by mouth once daily.     doxycycline 100 MG tablet  Commonly known as: VIBRA-TABS  Take 1 tablet (100 mg total) by mouth 2 (two) times a day.            CONTINUE taking these medications      ALPRAZolam 0.5 MG tablet  Commonly known as: XANAX  Take 0.5 mg by mouth nightly as needed.     atorvastatin 40 MG tablet  Commonly known as: LIPITOR  Take 40 mg by mouth every evening.     ferrous sulfate Tab tablet  Commonly known as: FEOSOL  Take 1 tablet by mouth daily with breakfast.     JARDIANCE 10 mg tablet  Generic drug: empagliflozin  Take 10  mg by mouth once daily.     NON FORMULARY MEDICATION  Place 1 drop into the right eye nightly. Glaucoma eye drop pt not sure medication     OCUVITE ADULT 50 PLUS ORAL  Take 1 tablet by mouth once daily.     pantoprazole 40 MG tablet  Commonly known as: PROTONIX  Take 1 tablet (40 mg total) by mouth once daily.     pramipexole 0.5 MG tablet  Commonly known as: MIRAPEX  Take 0.5-1.5 mg by mouth every evening. 3 tabs by mouth at bedtime     VITAMIN B-12 ORAL  Take 1 tablet by mouth once daily.            STOP taking these medications      METOPROLOL SUCCINATE ORAL              Impression:  PAF - now SB    - CHADSVASC: 5  Hx of GIB    - HAS-BLED: 4  CAD  Bilateral MINDY  PAD  CLARICE on CPAP    Plan:   Pt is stable for discharge.   Per Dr. Colon, start ASA 81 mg daily and Plavix 75 mg daily. Due to the patient's history of GIB, instructed the importance of monitoring for acute bleeding. Reviewed risks with taking ASA/Plavix. The patient and his wife agree and reported an understanding.  Protonix 40 mg daily and Doxycycline 100 mg BID x 3 days.   Groin precautions reviewed.  Pt is SB on tele, ranging 40's - 50's. Will hold BB for now. Will reassess in the outpatient setting at follow-up appointment. He remains asymptomatic.   Keep scheduled f/u with appt with Dr. Colon on 4.5.23.    Pt hypertensive. Start amlodipine 5 mg. Instructed to keep log at home. Call office tomorrow if remains elevated.     Time spent on the discharge of patient: 39 minutes    STEPHANIE Ponce  Cardiology  [facility] - 6th Floor Medical Telemetry

## 2023-03-29 NOTE — NURSING
Nurses Note -- 4 Eyes      3/28/2023   7:02 PM      Skin assessed during: Admit      [x] No Pressure Injuries Present    [x]Prevention Measures Documented      [] Yes- Altered Skin Integrity Present or Discovered   [] LDA Added if Not in Epic (Describe Wound)   [] New Altered Skin Integrity was Present on Admit and Documented in LDA   [] Wound Image Taken    Wound Care Consulted? No    Attending Nurse:  Janiya Valdez RN     Second RN/Staff Member:  Gisela

## 2023-03-31 LAB
ABO + RH BLD: NORMAL
BLD PROD TYP BPU: NORMAL
BLOOD UNIT EXPIRATION DATE: NORMAL
BLOOD UNIT TYPE CODE: 6200
CROSSMATCH INTERPRETATION: NORMAL
DISPENSE STATUS: NORMAL
UNIT NUMBER: NORMAL

## 2023-03-31 NOTE — ANESTHESIA POSTPROCEDURE EVALUATION
Anesthesia Post Evaluation    Patient: Rico Nagel    Procedure(s) Performed: Procedure(s) (LRB):  Left atrial appendage closure device (N/A)  Transesophageal echo (DELORIS) intra-procedure log documentation (N/A)    Final Anesthesia Type: general      Patient location during evaluation: PACU  Patient participation: Yes- Able to Participate  Level of consciousness: awake and alert  Post-procedure vital signs: reviewed and stable  Pain management: adequate  Airway patency: patent      Anesthetic complications: no      Cardiovascular status: hemodynamically stable  Respiratory status: unassisted  Hydration status: euvolemic  Follow-up not needed.          Vitals Value Taken Time   /69 03/29/23 1551   Temp 36.6 °C (97.8 °F) 03/29/23 1551   Pulse 61 03/29/23 1551   Resp 18 03/29/23 1551   SpO2 97 % 03/29/23 1551         Event Time   Out of Recovery 17:00:00         Pain/Mica Score: No data recorded

## 2023-04-05 ENCOUNTER — LAB VISIT (OUTPATIENT)
Dept: LAB | Facility: HOSPITAL | Age: 83
End: 2023-04-05
Attending: INTERNAL MEDICINE
Payer: MEDICARE

## 2023-04-05 DIAGNOSIS — I25.10 CORONARY ATHEROSCLEROSIS OF NATIVE CORONARY ARTERY: Primary | ICD-10-CM

## 2023-04-05 DIAGNOSIS — I48.0 PAROXYSMAL ATRIAL FIBRILLATION: ICD-10-CM

## 2023-04-05 LAB
ANION GAP SERPL CALC-SCNC: 5 MEQ/L
BUN SERPL-MCNC: 24 MG/DL (ref 8.4–25.7)
CALCIUM SERPL-MCNC: 9.6 MG/DL (ref 8.8–10)
CHLORIDE SERPL-SCNC: 105 MMOL/L (ref 98–107)
CO2 SERPL-SCNC: 31 MMOL/L (ref 23–31)
CREAT SERPL-MCNC: 0.81 MG/DL (ref 0.73–1.18)
CREAT/UREA NIT SERPL: 30
ERYTHROCYTE [DISTWIDTH] IN BLOOD BY AUTOMATED COUNT: 14.7 % (ref 11.5–17)
GFR SERPLBLD CREATININE-BSD FMLA CKD-EPI: >60 MLS/MIN/1.73/M2
GLUCOSE SERPL-MCNC: 92 MG/DL (ref 82–115)
HCT VFR BLD AUTO: 47.7 % (ref 42–52)
HGB BLD-MCNC: 14.8 G/DL (ref 14–18)
INR BLD: 1.18 (ref 0–1.3)
MAGNESIUM SERPL-MCNC: 2.1 MG/DL (ref 1.6–2.6)
MCH RBC QN AUTO: 30 PG (ref 27–31)
MCHC RBC AUTO-ENTMCNC: 31 G/DL (ref 33–36)
MCV RBC AUTO: 96.8 FL (ref 80–94)
NRBC BLD AUTO-RTO: 0 %
PLATELET # BLD AUTO: 220 X10(3)/MCL (ref 130–400)
PMV BLD AUTO: 9.7 FL (ref 7.4–10.4)
POTASSIUM SERPL-SCNC: 4.5 MMOL/L (ref 3.5–5.1)
PROTHROMBIN TIME: 14.9 SECONDS (ref 12.5–14.5)
RBC # BLD AUTO: 4.93 X10(6)/MCL (ref 4.7–6.1)
SODIUM SERPL-SCNC: 141 MMOL/L (ref 136–145)
WBC # SPEC AUTO: 6.7 X10(3)/MCL (ref 4.5–11.5)

## 2023-04-05 PROCEDURE — 85610 PROTHROMBIN TIME: CPT

## 2023-04-05 PROCEDURE — 83735 ASSAY OF MAGNESIUM: CPT

## 2023-04-05 PROCEDURE — 85027 COMPLETE CBC AUTOMATED: CPT

## 2023-04-05 PROCEDURE — 36415 COLL VENOUS BLD VENIPUNCTURE: CPT

## 2023-04-05 PROCEDURE — 80048 BASIC METABOLIC PNL TOTAL CA: CPT

## 2023-05-04 ENCOUNTER — ANESTHESIA EVENT (OUTPATIENT)
Dept: CARDIOLOGY | Facility: HOSPITAL | Age: 83
End: 2023-05-04
Payer: MEDICARE

## 2023-05-04 ENCOUNTER — ANESTHESIA (OUTPATIENT)
Dept: CARDIOLOGY | Facility: HOSPITAL | Age: 83
End: 2023-05-04
Payer: MEDICARE

## 2023-05-04 ENCOUNTER — HOSPITAL ENCOUNTER (OUTPATIENT)
Dept: CARDIOLOGY | Facility: HOSPITAL | Age: 83
Discharge: HOME | End: 2023-05-04
Attending: INTERNAL MEDICINE | Admitting: INTERNAL MEDICINE
Payer: MEDICARE

## 2023-05-04 VITALS
HEIGHT: 71 IN | SYSTOLIC BLOOD PRESSURE: 150 MMHG | WEIGHT: 184.31 LBS | BODY MASS INDEX: 25.8 KG/M2 | DIASTOLIC BLOOD PRESSURE: 98 MMHG | OXYGEN SATURATION: 94 % | HEART RATE: 97 BPM | RESPIRATION RATE: 16 BRPM | TEMPERATURE: 98 F

## 2023-05-04 DIAGNOSIS — I48.19 PERSISTENT ATRIAL FIBRILLATION: Primary | ICD-10-CM

## 2023-05-04 DIAGNOSIS — Z01.810 PREOP CARDIOVASCULAR EXAM: ICD-10-CM

## 2023-05-04 DIAGNOSIS — I48.19 PERSISTENT ATRIAL FIBRILLATION: ICD-10-CM

## 2023-05-04 DIAGNOSIS — I48.91 A-FIB: ICD-10-CM

## 2023-05-04 LAB
BSA FOR ECHO PROCEDURE: 2.04 M2
POCT GLUCOSE: 115 MG/DL (ref 70–110)

## 2023-05-04 PROCEDURE — 93010 EKG 12-LEAD: ICD-10-PCS | Mod: ,,, | Performed by: INTERNAL MEDICINE

## 2023-05-04 PROCEDURE — 93005 ELECTROCARDIOGRAM TRACING: CPT

## 2023-05-04 PROCEDURE — 63600175 PHARM REV CODE 636 W HCPCS: Performed by: NURSE ANESTHETIST, CERTIFIED REGISTERED

## 2023-05-04 PROCEDURE — 37000008 HC ANESTHESIA 1ST 15 MINUTES

## 2023-05-04 PROCEDURE — 93325 DOPPLER ECHO COLOR FLOW MAPG: CPT

## 2023-05-04 PROCEDURE — 93010 ELECTROCARDIOGRAM REPORT: CPT | Mod: ,,, | Performed by: INTERNAL MEDICINE

## 2023-05-04 PROCEDURE — D9220A PRA ANESTHESIA: ICD-10-PCS | Mod: CRNA,,, | Performed by: NURSE ANESTHETIST, CERTIFIED REGISTERED

## 2023-05-04 PROCEDURE — D9220A PRA ANESTHESIA: ICD-10-PCS | Mod: ANES,,, | Performed by: ANESTHESIOLOGY

## 2023-05-04 PROCEDURE — D9220A PRA ANESTHESIA: Mod: ANES,,, | Performed by: ANESTHESIOLOGY

## 2023-05-04 PROCEDURE — D9220A PRA ANESTHESIA: Mod: CRNA,,, | Performed by: NURSE ANESTHETIST, CERTIFIED REGISTERED

## 2023-05-04 PROCEDURE — 25000003 PHARM REV CODE 250: Performed by: NURSE ANESTHETIST, CERTIFIED REGISTERED

## 2023-05-04 RX ORDER — PROPOFOL 10 MG/ML
VIAL (ML) INTRAVENOUS
Status: DISCONTINUED | OUTPATIENT
Start: 2023-05-04 | End: 2023-05-04

## 2023-05-04 RX ORDER — LIDOCAINE HYDROCHLORIDE 20 MG/ML
INJECTION INTRAVENOUS
Status: DISCONTINUED | OUTPATIENT
Start: 2023-05-04 | End: 2023-05-04

## 2023-05-04 RX ADMIN — PROPOFOL 60 MG: 10 INJECTION, EMULSION INTRAVENOUS at 08:05

## 2023-05-04 RX ADMIN — LIDOCAINE HYDROCHLORIDE 80 MG: 20 INJECTION INTRAVENOUS at 08:05

## 2023-05-04 RX ADMIN — SODIUM CHLORIDE, SODIUM GLUCONATE, SODIUM ACETATE, POTASSIUM CHLORIDE AND MAGNESIUM CHLORIDE: 526; 502; 368; 37; 30 INJECTION, SOLUTION INTRAVENOUS at 08:05

## 2023-05-04 NOTE — DISCHARGE SUMMARY
[facility] - Cardiology Diagnostics  Discharge Note  Short Stay    Transesophageal echo (DELORIS)      OUTCOME: Patient tolerated treatment/procedure well without complication and is now ready for discharge.    DISPOSITION: Home or Self Care    FINAL DIAGNOSIS:  PAF (paroxysmal atrial fibrillation)    FOLLOWUP: In clinic    DISCHARGE INSTRUCTIONS:  No discharge procedures on file.     TIME SPENT ON DISCHARGE: 15 minutes

## 2023-05-04 NOTE — ANESTHESIA PREPROCEDURE EVALUATION
Vent. Rate : 052 BPM     Atrial Rate : 052 BPM      P-R Int : 282 ms          QRS Dur : 148 ms       QT Int : 468 ms       P-R-T Axes : 058 -54 140 degrees      QTc Int : 435 ms     Sinus bradycardia with 1st degree A-V block with Premature atrial complexes   in a pattern of bigeminy   LAFB   Right bundle branch block   LVH with repolarization abnormality ( R in aVL , Romhilt-Beard )     Abnormal ECG   Confirmed by Hay Jean MD ([id number]) on 3/29/2023 8:47:01 PM     Referred By: TAM REYES           Confirmed By:Hay Jean MD      Specimen Collected: 03/29/23 09:50 Last Resulted: 03/29 05/04/2023  Rico Nagel is a 82 y.o., male.  Date/Time: 05/04/23 0730   Scheduled providers: Tam Reyes MD; Rico Gann MD   Procedure: TRANSESOPHAGEAL ECHO (DELORIS) (CUPID ONLY)   Diagnosis:        Persistent atrial fibrillation [I48.19]       A-fib [I48.91]   Location: [facility] - Cardiology Diagnostics         Pre-op Assessment    I have reviewed the Patient Summary Reports.     I have reviewed the Nursing Notes. I have reviewed the NPO Status.   I have reviewed the Medications.     Review of Systems  Anesthesia Hx:  No problems with previous Anesthesia   Denies Personal Hx of Anesthesia complications.   Hematology/Oncology:  Hematology Normal   Oncology Normal     EENT/Dental:EENT/Dental Normal   Cardiovascular:   Exercise tolerance: good Hypertension CAD  Dysrhythmias   Functional Capacity 2 METS    Pulmonary:   Sleep Apnea    Renal/:   Denies Chronic Renal Disease.     Hepatic/GI:  Hepatic/GI Normal    Musculoskeletal:  Musculoskeletal Normal    Neurological:  Neurology Normal    Endocrine:   Diabetes  Denies Morbid Obesity / BMI > 40  Dermatological:  Skin Normal    Psych:  Psychiatric Normal           Physical Exam  General: Alert, Oriented, Well nourished and  Cooperative    Airway:  Mallampati: II   Mouth Opening: Normal  TM Distance: Normal  Tongue: Normal  Neck ROM: Normal ROM    Dental:  Intact    Chest/Lungs:  Clear to auscultation, Normal Respiratory Rate    Heart:  Rate: Normal  Rhythm: Regular Rhythm       Latest Reference Range & Units Most Recent   WBC 4.5 - 11.5 x10(3)/mcL 6.7  4/5/23 12:43   RBC 4.70 - 6.10 x10(6)/mcL 4.93  4/5/23 12:43   Hemoglobin 14.0 - 18.0 g/dL 14.8  4/5/23 12:43   Hematocrit 42.0 - 52.0 % 47.7  4/5/23 12:43   MCV 80.0 - 94.0 fL 96.8 (H)  4/5/23 12:43   MCH 27.0 - 31.0 pg 30.0  4/5/23 12:43   MCHC 33.0 - 36.0 g/dL 31.0 (L)  4/5/23 12:43   RDW 11.5 - 17.0 % 14.7  4/5/23 12:43   Platelets 130 - 400 x10(3)/mcL 220  4/5/23 12:43   MPV 7.4 - 10.4 fL 9.7  4/5/23 12:43   Neut % % 77.9  3/29/23 05:40   LYMPH % % 12.6  3/29/23 05:40   Mono % % 9.0  3/29/23 05:40   Eosinophil % % 0.1  3/29/23 05:40   Basophil % % 0.1  3/29/23 05:40   Immature Granulocytes % 0.3  3/29/23 05:40   Neut # 2.1 - 9.2 x10(3)/mcL 7.14  3/29/23 05:40   Lymph # 0.6 - 4.6 x10(3)/mcL 1.15  3/29/23 05:40   Mono # 0.1 - 1.3 x10(3)/mcL 0.82  3/29/23 05:40   Eos # 0 - 0.9 x10(3)/mcL 0.01  3/29/23 05:40   Baso # 0 - 0.2 x10(3)/mcL 0.01  3/29/23 05:40   Immature Grans (Abs) 0 - 0.04 x10(3)/mcL 0.03  3/29/23 05:40   nRBC % 0.0  4/5/23 12:43   Protime 12.5 - 14.5 seconds 14.9 (H)  4/5/23 12:43   INR 0.00 - 1.30  1.18  4/5/23 12:43   aPTT 23.2 - 33.7 seconds 32.0  3/27/23 10:15   Sodium 136 - 145 mmol/L 141  4/5/23 12:43   Potassium 3.5 - 5.1 mmol/L 4.5  4/5/23 12:43   Chloride 98 - 107 mmol/L 105  4/5/23 12:43   CO2 23 - 31 mmol/L 31  4/5/23 12:43   Anion Gap mEq/L 5.0  4/5/23 12:43   BUN 8.4 - 25.7 mg/dL 24.0  4/5/23 12:43   Creatinine 0.73 - 1.18 mg/dL 0.81  4/5/23 12:43   BUN/CREAT RATIO  30  4/5/23 12:43   eGFR mls/min/1.73/m2 >60  4/5/23 12:43   Glucose 82 - 115 mg/dL 92  4/5/23 12:43   Calcium 8.8 - 10.0 mg/dL 9.6  4/5/23 12:43   Magnesium 1.60 - 2.60 mg/dL 2.10  4/5/23 12:43    Alkaline Phosphatase 40 - 150 unit/L 95  3/27/23 10:15   PROTEIN TOTAL 5.8 - 7.6 gm/dL 6.2  3/27/23 10:15   Albumin 3.4 - 4.8 g/dL 3.5  3/27/23 10:15   Albumin/Globulin Ratio 1.1 - 2.0 ratio 1.3  3/27/23 10:15   BILIRUBIN TOTAL <=1.5 mg/dL 0.9  3/27/23 10:15   AST 5 - 34 unit/L 20  3/27/23 10:15   ALT 0 - 55 unit/L 18  3/27/23 10:15   Globulin, Total 2.4 - 3.5 gm/dL 2.7  3/27/23 10:15   Group & Rh  A POS  3/27/23 10:15   Indirect Mabel GEL  NEG  3/27/23 10:15   Specimen Outdate  03/30/2023 23:59  3/27/23 10:15   PREPARE RBC SOFT  Rpt  3/27/23 10:15   MRSA SCREEN BY PCR Not Detected  Not Detected  3/27/23 10:15   Color, UA Yellow, Light-Yellow, Dark Yellow, Ирина, Straw  Yellow  3/27/23 10:15   Appearance, UA Clear  Clear  3/27/23 10:15   Specific Gravity,UA 1.005 - 1.030  >=1.040 (H)  3/27/23 10:15   pH, UA 5.0 - 8.5  5.5  3/27/23 10:15   Protein, UA Negative mg/dL 1+ !  3/27/23 10:15   Glucose, UA Negative, Normal mg/dL 3+ !  3/27/23 10:15   Ketones, UA Negative mg/dL Negative  3/27/23 10:15   Occult Blood UA Negative unit/L Negative  3/27/23 10:15   NITRITE UA Negative  Negative  3/27/23 10:15   Bilirubin, UA Negative mg/dL Negative  3/27/23 10:15   Urobilinogen, UA 0.2, 1.0, Normal mg/dL 1.0  3/27/23 10:15   Leukocytes, UA Negative unit/L Negative  3/27/23 10:15   RBC, UA <=5 /HPF <5  3/27/23 10:15   WBC, UA <=5 /HPF <5  3/27/23 10:15   Bacteria, UA None Seen, Rare, Occasional /HPF None Seen  3/27/23 10:15   Squam Epithel, UA <=5 /HPF <5  3/27/23 10:15   POCT Glucose 70 - 110 mg/dL 115 (H)  5/4/23 06:07   POC Glucose 70 - 110 MG/ ! (E)  3/28/23 11:25   XR CHEST PA AND LATERAL  Rpt  3/27/23 10:58   EKG 12-LEAD  Rpt  3/29/23 09:50   TRANSESOPHAGEAL ECHO (DELORIS) (CUPID ONLY)  Rpt  3/28/23 12:56   TRANSESOPHAGEL ECHOCARDIOGRAM INTRA-PROCEDURE DOCUMENTATION  Rpt  3/28/23 11:15   ECHO (CUPID ONLY)  Rpt  3/29/23 08:34   CARDIAC CATHETERIZATION  Rpt  3/28/23 11:15   CARDIAC MONITORING STRIPS  Rpt  3/29/23 15:06    CROSSMATCH INTERPRETATION  Compatible  3/27/23 10:15  Compatible  3/27/23 10:15  Compatible  3/27/23 10:15  Compatible  3/27/23 10:15   (H): Data is abnormally high  (L): Data is abnormally low  !: Data is abnormal  Rpt: View report in Results Review for more information   (E): External lab resultPost Amulet implantation.   No significant pericardial effusion or tamponade physiology.   Mild-to-moderate aortic regurgitation.   Mild mitral regurgitation.   Mild tricuspid regurgitation.Left ventricular diastolic dysfunction.   Mildly decreased systolic function.   Normal right ventricular size with normal right ventricular systolic function.   Moderate to severe left atrial enlargement.   No interatrial septal defect present.   Normal appearing left atrial appendage. No thrombus is present in the appendage.   Mild right atrial enlargement.   Mild mitral regurgitation.               Anesthesia Plan  Type of Anesthesia, risks & benefits discussed:    Anesthesia Type: Gen Natural Airway  Intra-op Monitoring Plan: Standard ASA Monitors  Post Op Pain Control Plan: multimodal analgesia  Induction:  IV  Airway Plan: Direct  Informed Consent: Informed consent signed with the Patient and all parties understand the risks and agree with anesthesia plan.  All questions answered. Patient consented to blood products? Yes  ASA Score: 4  Day of Surgery Review of History & Physical: H&P Update referred to the surgeon/provider.I have interviewed and examined the patient. I have reviewed the patient's H&P dated: There are no significant changes.     Ready For Surgery From Anesthesia Perspective.     .

## 2023-05-04 NOTE — TRANSFER OF CARE
"Anesthesia Transfer of Care Note    Patient: Rico Nagel    Procedure(s) Performed: * No procedures listed *    Patient location: PACU    Anesthesia Type: general    Transport from OR: Transported from OR on room air with adequate spontaneous ventilation    Post pain: adequate analgesia    Post assessment: no apparent anesthetic complications and tolerated procedure well    Post vital signs: stable    Level of consciousness: responds to stimulation    Nausea/Vomiting: no nausea/vomiting    Complications: none    Transfer of care protocol was followed      Last vitals:   Visit Vitals  BP (!) 129/93   Pulse 108   Temp 36.4 °C (97.5 °F)   Resp 14   Ht 5' 10.5" (1.791 m)   Wt 83.6 kg (184 lb 4.9 oz)   SpO2 99%   BMI 26.07 kg/m²     "

## 2023-05-05 NOTE — ANESTHESIA POSTPROCEDURE EVALUATION
Anesthesia Post Evaluation    Patient: Rico Nagel    Procedure(s) Performed: * No procedures listed *    Final Anesthesia Type: general      Patient location during evaluation: PACU  Patient participation: Yes- Able to Participate  Level of consciousness: awake and alert and oriented  Post-procedure vital signs: reviewed and stable  Pain management: adequate  Airway patency: patent  CLARICE mitigation strategies: Verification of full reversal of neuromuscular block  PONV status at discharge: No PONV  Anesthetic complications: no      Cardiovascular status: blood pressure returned to baseline and stable  Respiratory status: spontaneous ventilation and unassisted  Hydration status: euvolemic  Follow-up not needed.  Comments: [facility]          Vitals Value Taken Time   /98 05/04/23 0920   Temp 36.4 °C (97.5 °F) 05/04/23 0817   Pulse 98 05/04/23 0921   Resp 16 05/04/23 0845   SpO2 95 % 05/04/23 0921   Vitals shown include unvalidated device data.      Event Time   Out of Recovery 08:44:00         Pain/Mica Score: Mica Score: 10 (5/4/2023  8:43 AM)

## 2023-06-13 ENCOUNTER — CLINICAL SUPPORT (OUTPATIENT)
Dept: FAMILY MEDICINE | Facility: CLINIC | Age: 83
End: 2023-06-13
Payer: MEDICARE

## 2023-06-13 DIAGNOSIS — E11.9 TYPE 2 DIABETES MELLITUS WITHOUT COMPLICATION, WITHOUT LONG-TERM CURRENT USE OF INSULIN: ICD-10-CM

## 2023-06-13 DIAGNOSIS — I10 HYPERTENSION, UNSPECIFIED TYPE: Primary | ICD-10-CM

## 2023-06-13 DIAGNOSIS — Z79.899 ENCOUNTER FOR LONG-TERM (CURRENT) USE OF OTHER MEDICATIONS: ICD-10-CM

## 2023-06-13 DIAGNOSIS — E55.9 VITAMIN D DEFICIENCY: ICD-10-CM

## 2023-06-13 LAB
ALBUMIN SERPL-MCNC: 3.7 G/DL (ref 3.4–4.8)
ALP SERPL-CCNC: 114 UNIT/L (ref 40–150)
ALT SERPL-CCNC: 19 UNIT/L (ref 0–55)
ANION GAP SERPL CALC-SCNC: 8 MEQ/L
AST SERPL-CCNC: 20 UNIT/L (ref 5–34)
BASOPHILS # BLD AUTO: 0.05 X10(3)/MCL
BASOPHILS NFR BLD AUTO: 0.7 %
BILIRUBIN DIRECT+TOT PNL SERPL-MCNC: 0.5 MG/DL (ref 0–?)
BILIRUBIN DIRECT+TOT PNL SERPL-MCNC: 1 MG/DL (ref 0–0.8)
BILIRUBIN DIRECT+TOT PNL SERPL-MCNC: 1.5 MG/DL
BUN SERPL-MCNC: 21.4 MG/DL (ref 8.4–25.7)
CALCIUM SERPL-MCNC: 9.1 MG/DL (ref 8.8–10)
CHLORIDE SERPL-SCNC: 105 MMOL/L (ref 98–107)
CHOLEST SERPL-MCNC: 130 MG/DL
CHOLEST/HDLC SERPL: 2 {RATIO} (ref 0–5)
CK SERPL-CCNC: 89 U/L (ref 30–200)
CO2 SERPL-SCNC: 28 MMOL/L (ref 23–31)
CREAT SERPL-MCNC: 1 MG/DL (ref 0.73–1.18)
CREAT/UREA NIT SERPL: 21
DEPRECATED CALCIDIOL+CALCIFEROL SERPL-MC: 53.6 NG/ML (ref 30–80)
EOSINOPHIL # BLD AUTO: 0.15 X10(3)/MCL (ref 0–0.9)
EOSINOPHIL NFR BLD AUTO: 2 %
ERYTHROCYTE [DISTWIDTH] IN BLOOD BY AUTOMATED COUNT: 14.3 % (ref 11.5–17)
EST. AVERAGE GLUCOSE BLD GHB EST-MCNC: 134.1 MG/DL
GFR SERPLBLD CREATININE-BSD FMLA CKD-EPI: >60 MLS/MIN/1.73/M2
GLUCOSE SERPL-MCNC: 128 MG/DL (ref 82–115)
HBA1C MFR BLD: 6.3 %
HCT VFR BLD AUTO: 46.8 % (ref 42–52)
HDLC SERPL-MCNC: 55 MG/DL (ref 35–60)
HGB BLD-MCNC: 14.4 G/DL (ref 14–18)
IMM GRANULOCYTES # BLD AUTO: 0.02 X10(3)/MCL (ref 0–0.04)
IMM GRANULOCYTES NFR BLD AUTO: 0.3 %
LDLC SERPL CALC-MCNC: 60 MG/DL (ref 50–140)
LYMPHOCYTES # BLD AUTO: 1.5 X10(3)/MCL (ref 0.6–4.6)
LYMPHOCYTES NFR BLD AUTO: 20.2 %
MCH RBC QN AUTO: 31.4 PG (ref 27–31)
MCHC RBC AUTO-ENTMCNC: 30.8 G/DL (ref 33–36)
MCV RBC AUTO: 102.2 FL (ref 80–94)
MONOCYTES # BLD AUTO: 0.75 X10(3)/MCL (ref 0.1–1.3)
MONOCYTES NFR BLD AUTO: 10.1 %
NEUTROPHILS # BLD AUTO: 4.96 X10(3)/MCL (ref 2.1–9.2)
NEUTROPHILS NFR BLD AUTO: 66.7 %
NRBC BLD AUTO-RTO: 0 %
PLATELET # BLD AUTO: 251 X10(3)/MCL (ref 130–400)
PMV BLD AUTO: 9.5 FL (ref 7.4–10.4)
POTASSIUM SERPL-SCNC: 4 MMOL/L (ref 3.5–5.1)
PROT SERPL-MCNC: 6.9 GM/DL (ref 5.8–7.6)
RBC # BLD AUTO: 4.58 X10(6)/MCL (ref 4.7–6.1)
SODIUM SERPL-SCNC: 141 MMOL/L (ref 136–145)
TRIGL SERPL-MCNC: 73 MG/DL (ref 34–140)
TSH SERPL-ACNC: 2.02 UIU/ML (ref 0.35–4.94)
URATE SERPL-MCNC: 3.7 MG/DL (ref 3.5–7.2)
VLDLC SERPL CALC-MCNC: 15 MG/DL
WBC # SPEC AUTO: 7.43 X10(3)/MCL (ref 4.5–11.5)

## 2023-06-13 PROCEDURE — 82550 ASSAY OF CK (CPK): CPT | Performed by: FAMILY MEDICINE

## 2023-06-13 PROCEDURE — 85025 COMPLETE CBC W/AUTO DIFF WBC: CPT | Performed by: FAMILY MEDICINE

## 2023-06-13 PROCEDURE — 84550 ASSAY OF BLOOD/URIC ACID: CPT | Performed by: FAMILY MEDICINE

## 2023-06-13 PROCEDURE — 82306 VITAMIN D 25 HYDROXY: CPT | Performed by: FAMILY MEDICINE

## 2023-06-13 PROCEDURE — 80076 HEPATIC FUNCTION PANEL: CPT | Performed by: FAMILY MEDICINE

## 2023-06-13 PROCEDURE — 83036 HEMOGLOBIN GLYCOSYLATED A1C: CPT | Performed by: FAMILY MEDICINE

## 2023-06-13 PROCEDURE — 84443 ASSAY THYROID STIM HORMONE: CPT | Performed by: FAMILY MEDICINE

## 2023-06-13 PROCEDURE — 80061 LIPID PANEL: CPT | Performed by: FAMILY MEDICINE

## 2023-06-13 PROCEDURE — 80048 BASIC METABOLIC PNL TOTAL CA: CPT | Performed by: FAMILY MEDICINE

## 2023-06-13 NOTE — PROGRESS NOTES
Please inform patient of test results which are within acceptable ranges for age and conditions. No change in therapy is requested at this time. Keep follow-up appointment as scheduled.

## 2023-06-14 LAB — PATH REV: NORMAL

## 2023-06-15 ENCOUNTER — DOCUMENTATION ONLY (OUTPATIENT)
Dept: FAMILY MEDICINE | Facility: CLINIC | Age: 83
End: 2023-06-15
Payer: MEDICARE

## 2023-06-15 PROBLEM — I10 ESSENTIAL HYPERTENSION, BENIGN: Status: ACTIVE | Noted: 2023-06-15

## 2023-06-15 PROBLEM — F41.9 ANXIETY: Status: ACTIVE | Noted: 2023-06-15

## 2023-06-15 PROBLEM — E11.9 DIABETES: Status: ACTIVE | Noted: 2023-06-15

## 2023-06-15 PROBLEM — K21.9 GERD (GASTROESOPHAGEAL REFLUX DISEASE): Status: ACTIVE | Noted: 2023-06-15

## 2023-06-15 PROBLEM — G25.81 RLS (RESTLESS LEGS SYNDROME): Status: ACTIVE | Noted: 2023-06-15

## 2023-06-15 RX ORDER — TADALAFIL 10 MG/1
10 TABLET ORAL DAILY PRN
COMMUNITY
End: 2023-12-19 | Stop reason: SDUPTHER

## 2023-06-15 RX ORDER — METOPROLOL SUCCINATE 25 MG/1
25 TABLET, EXTENDED RELEASE ORAL 2 TIMES DAILY
COMMUNITY
Start: 2023-06-04

## 2023-06-15 RX ORDER — BLOOD SUGAR DIAGNOSTIC
STRIP MISCELLANEOUS
COMMUNITY
Start: 2023-03-03

## 2023-06-15 RX ORDER — CHOLECALCIFEROL (VITAMIN D3) 125 MCG
5000 CAPSULE ORAL DAILY
COMMUNITY

## 2023-06-15 RX ORDER — ALBUTEROL SULFATE 90 UG/1
2 AEROSOL, METERED RESPIRATORY (INHALATION) EVERY 6 HOURS PRN
COMMUNITY
End: 2023-08-15

## 2023-06-15 RX ORDER — DILTIAZEM HYDROCHLORIDE 240 MG/1
240 CAPSULE, COATED, EXTENDED RELEASE ORAL DAILY
COMMUNITY
End: 2023-08-15 | Stop reason: ALTCHOICE

## 2023-06-15 RX ORDER — TAFLUPROST 0 MG/.3ML
1 SOLUTION/ DROPS OPHTHALMIC NIGHTLY
COMMUNITY
Start: 2023-05-23

## 2023-06-19 ENCOUNTER — OFFICE VISIT (OUTPATIENT)
Dept: FAMILY MEDICINE | Facility: CLINIC | Age: 83
End: 2023-06-19
Payer: MEDICARE

## 2023-06-19 VITALS
OXYGEN SATURATION: 98 % | HEIGHT: 71 IN | HEART RATE: 91 BPM | BODY MASS INDEX: 25.34 KG/M2 | TEMPERATURE: 98 F | WEIGHT: 181 LBS | SYSTOLIC BLOOD PRESSURE: 132 MMHG | DIASTOLIC BLOOD PRESSURE: 80 MMHG | RESPIRATION RATE: 16 BRPM

## 2023-06-19 DIAGNOSIS — E11.9 TYPE 2 DIABETES MELLITUS WITHOUT COMPLICATION, WITHOUT LONG-TERM CURRENT USE OF INSULIN: ICD-10-CM

## 2023-06-19 DIAGNOSIS — K21.9 GASTROESOPHAGEAL REFLUX DISEASE, UNSPECIFIED WHETHER ESOPHAGITIS PRESENT: ICD-10-CM

## 2023-06-19 DIAGNOSIS — D62 ANEMIA ASSOCIATED WITH ACUTE BLOOD LOSS: Primary | ICD-10-CM

## 2023-06-19 DIAGNOSIS — F41.9 ANXIETY: ICD-10-CM

## 2023-06-19 PROCEDURE — 99214 OFFICE O/P EST MOD 30 MIN: CPT | Mod: ,,, | Performed by: FAMILY MEDICINE

## 2023-06-19 PROCEDURE — 99214 PR OFFICE/OUTPT VISIT, EST, LEVL IV, 30-39 MIN: ICD-10-PCS | Mod: ,,, | Performed by: FAMILY MEDICINE

## 2023-06-19 RX ORDER — ALPRAZOLAM 0.5 MG/1
0.5 TABLET ORAL NIGHTLY PRN
Qty: 30 TABLET | Refills: 1 | Status: SHIPPED | OUTPATIENT
Start: 2023-06-19 | End: 2023-10-09 | Stop reason: SDUPTHER

## 2023-06-19 RX ORDER — FUROSEMIDE 20 MG/1
20 TABLET ORAL 2 TIMES DAILY
COMMUNITY
Start: 2023-06-14

## 2023-06-19 RX ORDER — LISINOPRIL 10 MG/1
1 TABLET ORAL
COMMUNITY
End: 2023-08-15 | Stop reason: ALTCHOICE

## 2023-06-19 NOTE — ASSESSMENT & PLAN NOTE
Pt. Reports taking only a few Alprazolam a month now.He reports a favorable response to the Alprazolam and request a refill.

## 2023-06-19 NOTE — PROGRESS NOTES
Patient Name: Rico Nagel     Patient ID: [id number]     Chief Complaint: Lab Results      HPI:     Rico Nagel is a 83 y.o. male here today for lab result review.    Past Medical History:   Diagnosis Date    Anxiety disorder, unspecified     Arthritis     Atrial fibrillation     Carotid artery stenosis     Coronary artery disease     Diabetes mellitus     Eczema     GI bleeding     History of kidney stones     Hypertension     Insomnia     Iron deficiency anemia, unspecified     PAD (peripheral artery disease)     Restless leg syndrome     Sleep apnea     does not use CPAP since recall    Unspecified glaucoma     Vitamin D deficiency         Past Surgical History:   Procedure Laterality Date    CARDIOVERSION  08/08/2022    CLOSURE OF LEFT ATRIAL APPENDAGE USING DEVICE N/A 03/28/2023    Procedure: Left atrial appendage closure device;  Surgeon: Ayo Colon MD;  Location: [facility] CATH LAB;  Service: Cardiology;  Laterality: N/A;  AMULET W/ INTRA OP DELORIS    COLONOSCOPY      CORONARY ANGIOPLASTY WITH STENT PLACEMENT N/A 01/2015    LO to LAD & BMS to RPLA    CYSTOSCOPY W/ STONE MANIPULATION N/A     ECHOCARDIOGRAM, TRANSESOPHAGEAL  05/04/2023    Dr. Colon    ECHOCARDIOGRAM,TRANSESOPHAGEAL N/A 03/28/2023    Procedure: Transesophageal echo (DELORIS) intra-procedure log documentation;  Surgeon: Pee Diagnostic Provider;  Location: [facility] CATH LAB;  Service: Cardiology;  Laterality: N/A;    ESOPHAGOGASTRODUODENOSCOPY  08/10/2022    EXTERNAL EAR SURGERY Left 11/2023    EYE SURGERY Bilateral     Cataract & Glaucoma    KIDNEY STONE SURGERY      KNEE ARTHROPLASTY Left     LEFT HEART CATHETERIZATION Left 06/26/2023    Procedure: Left heart cath;  Surgeon: Laura Johnson MD;  Location: [facility] CATH LAB;  Service: Cardiology;  Laterality: Left;  LHC VIA RT GROIN    PERCUTANEOUS NEPHROLITHOTOMY          Social History     Socioeconomic History    Marital status:     Number of children: 1   Tobacco Use    Smoking status: Some  Days     Types: Cigars    Smokeless tobacco: Never   Substance and Sexual Activity    Alcohol use: Yes     Comment: 3x week    Drug use: Never    Sexual activity: Not Currently     Social Determinants of Health     Financial Resource Strain: Low Risk  (6/19/2023)    Overall Financial Resource Strain (CARDIA)     Difficulty of Paying Living Expenses: Not very hard   Food Insecurity: No Food Insecurity (6/19/2023)    Hunger Vital Sign     Worried About Running Out of Food in the Last Year: Never true     Ran Out of Food in the Last Year: Never true   Transportation Needs: No Transportation Needs (6/19/2023)    PRAPARE - Transportation     Lack of Transportation (Medical): No     Lack of Transportation (Non-Medical): No   Physical Activity: Insufficiently Active (6/19/2023)    Exercise Vital Sign     Days of Exercise per Week: 1 day     Minutes of Exercise per Session: 10 min   Stress: No Stress Concern Present (6/19/2023)    [employer] [employer] - Occupational Stress Questionnaire     Feeling of Stress : Not at all   Social Connections: Moderately Integrated (6/19/2023)    Social Connection and Isolation Panel [NHANES]     Frequency of Communication with Friends and Family: More than three times a week     Frequency of Social Gatherings with Friends and Family: More than three times a week     Attends [employer] Services: 1 to 4 times per year     Active Member of Clubs or Organizations: No     Attends Club or Organization Meetings: 1 to 4 times per year     Marital Status:    Housing Stability: Low Risk  (6/19/2023)    Housing Stability Vital Sign     Unable to Pay for Housing in the Last Year: No     Number of Places Lived in the Last Year: 1     Unstable Housing in the Last Year: No        Current Outpatient Medications   Medication Instructions    ALPRAZolam (XANAX) 0.5 mg, Oral, Nightly PRN    aspirin (ECOTRIN) 81 mg, Oral, Daily    atorvastatin (LIPITOR) 20 mg, Oral, Nightly     "cholecalciferol (vitamin D3) 5,000 Units, Oral, Daily    CONTOUR NEXT TEST STRIPS Strp USE TO TEST BLOOD GLUCOSE ONCE A DAY    cyanocobalamin, vitamin B-12, (VITAMIN B-12 ORAL) 1 tablet, Oral, Nightly, 1000mcg    diltiaZEM (TIAZAC) 180 mg, Oral, Daily    ENTRESTO 24-26 mg per tablet 1 tablet, Oral, 2 times daily    ferrous sulfate (FEOSOL) Tab tablet 1 tablet, Oral, With breakfast    furosemide (LASIX) 20 mg, Oral, 2 times daily    metoprolol succinate (TOPROL-XL) 25 mg, Oral, 2 times daily    mv-mn/om3/dha/epa/fish/lut/nasima (OCUVITE ADULT 50 PLUS ORAL) 1 tablet, Oral, Nightly    pantoprazole (PROTONIX) 40 mg, Oral, Daily    pramipexole (MIRAPEX) 0.5-1 mg, Oral, Nightly    tadalafiL (CIALIS) 10 mg, Oral, Daily PRN    ZIOPTAN, PF, 0.0015 % Dpet 1 drop, Right Eye, Nightly       Review of patient's allergies indicates:   Allergen Reactions    Aspirin Other (See Comments)     Can't take 325 mg d/t GI Bleed, can take 81 mg    Eliquis [apixaban] Other (See Comments)     GI Bleed; severe bleeding from nose & mouth        Immunization History   Administered Date(s) Administered    COVID-19, MRNA, LN-S, PF (Pfizer) (Purple Cap) 01/27/2021, 02/24/2021       Patient Care Team:  Leon Vaughan Sr., MD as PCP - General (Family Medicine)  Laura Johnson MD as Consulting Physician (Cardiology)  Ayo Colon MD as Consulting Physician (Cardiology)  Brielle Phelps MD as Consulting Physician (Ophthalmology)     Subjective:     Review of Systems    10 point review of systems conducted, negative except as stated in the history of present illness. See HPI for details.    Objective:     Visit Vitals  /80   Pulse 91   Temp 98.1 °F (36.7 °C)   Resp 16   Ht 5' 11" (1.803 m)   Wt 82.1 kg (181 lb)   SpO2 98%   BMI 25.24 kg/m²       Physical Exam  Constitutional:       Appearance: Normal appearance.   HENT:      Head: Normocephalic and atraumatic.      Nose: Nose normal.   Cardiovascular:      Rate and Rhythm: Normal rate. " Rhythm irregular.      Heart sounds: Normal heart sounds.   Pulmonary:      Effort: Pulmonary effort is normal.      Breath sounds: Normal breath sounds.   Abdominal:      General: Abdomen is flat.      Palpations: Abdomen is soft.      Tenderness: There is no abdominal tenderness.   Musculoskeletal:         General: No swelling or tenderness. Normal range of motion.      Cervical back: Normal range of motion and neck supple.      Right lower leg: No edema.      Left lower leg: No edema.   Lymphadenopathy:      Cervical: No cervical adenopathy.   Skin:     General: Skin is warm and dry.   Neurological:      General: No focal deficit present.      Mental Status: He is alert and oriented to person, place, and time.   Psychiatric:         Mood and Affect: Mood normal.           Assessment:       ICD-10-CM ICD-9-CM   1. Anemia associated with acute blood loss  D62 285.1   2. Type 2 diabetes mellitus without complication, without long-term current use of insulin  E11.9 250.00   3. Gastroesophageal reflux disease, unspecified whether esophagitis present  K21.9 530.81   4. Anxiety  F41.9 300.00        Plan:     1. Anemia associated with acute blood loss  Overview:  H/H stable at this time . Will continue to monitor    Assessment & Plan:  MCV is elevated.Will get B12 and Folate level one month.LFT's are normal.      2. Type 2 diabetes mellitus without complication, without long-term current use of insulin  Overview:  Patient is diet controlled.  Follow ADA Diet. Avoid soda, simple sweets, and limit rice/pasta/breads/starches (no more than 45-50 grams per meal).  Maintain healthy weight with goal BMI <30.  Exercise 5 times per week for 30 minutes per day.  Stressed importance of daily foot exams.  Stressed importance of annual dilated eye exam.    Assessment & Plan:  A1C= 6.3%.  Well controlled on Jardiance.      3. Gastroesophageal reflux disease, unspecified whether esophagitis present  Overview:  Continue present med  tx.  Avoid spicy, acidic, fried foods and alcohol.  Eat 2-3 hours before going to bed.  Avoid tight clothing, chew food thoroughly.  Reduce caffeine intake, avoid soda.      4. Anxiety  Overview:  Continue with Xanax 0.5 mg nightly PRN anxiety.  Practice deep breathing or abdominal breathing exercises when anxiety occurs.  Exercise daily. Get sunlight daily.  Avoid caffeine, alcohol and stimulants.  Practice positive phrases and repeat throughout the day, along with yoga and relaxation techniques.  Set healthy boundaries, avoid people and conversations that increase stress.  Educated patient on the risks of serotonin based medications such as serotonin modulators and SSRIs/SNRIs including common side effects of nausea, GI upset, headache dizziness as well as the rare risk for worsening symptoms of depression including development of suicidal thoughts or ideations, and serotonin syndrome.   Discussed benefits of medication not becoming noticeable until up to 6 weeks from start date.   Reports any symptoms of suicidal or homicidal ideations immediately, if clinic is closed go to nearest emergency room.  Discussed possibility of using benzodiazepines    Assessment & Plan:  Pt. Reports taking only a few Alprazolam a month now.He reports a favorable response to the Alprazolam and request a refill.    Orders:  -     Discontinue: ALPRAZolam (XANAX) 0.5 MG tablet; Take 1 tablet (0.5 mg total) by mouth nightly as needed for Anxiety.  Dispense: 30 tablet; Refill: 1         [x] Discussed lab findings with the patient.  [] Discussed diet, exercise and if appropriate, weight loss.  [x] Instructions and information, including risks and benefits of prescribed medication(s) have been reviewed with the patient and patient verbalizes understanding. Questions have been answered to the patient's satisfaction.  [x] Appropriate counseling has been given regarding anxiety and depressive issues that were discussed today.  [] Any lab drawn  today will be reviewed by physician at the time it is received and appropriate recommendations bill be made and discussed with patient.     Follow up in about 3 months (around 9/19/2023) for Follow Up, With Fasting Labs prior to visit.   In addition to their scheduled follow up, the patient has also been instructed to follow up on as needed basis.     Future Appointments   Date Time Provider Department Center   3/19/2024  1:30 PM Leon Vaughan Sr., MD [facility] Vaughan Sr, MD

## 2023-06-24 DIAGNOSIS — F41.9 ANXIETY: ICD-10-CM

## 2023-06-24 RX ORDER — ALPRAZOLAM 0.5 MG/1
0.5 TABLET ORAL NIGHTLY PRN
Qty: 30 TABLET | Refills: 1 | Status: CANCELLED | OUTPATIENT
Start: 2023-06-24

## 2023-06-26 ENCOUNTER — HOSPITAL ENCOUNTER (OUTPATIENT)
Facility: HOSPITAL | Age: 83
Discharge: HOME | End: 2023-06-26
Attending: INTERNAL MEDICINE | Admitting: INTERNAL MEDICINE
Payer: MEDICARE

## 2023-06-26 VITALS
DIASTOLIC BLOOD PRESSURE: 81 MMHG | HEIGHT: 71 IN | HEART RATE: 71 BPM | SYSTOLIC BLOOD PRESSURE: 149 MMHG | TEMPERATURE: 98 F | RESPIRATION RATE: 10 BRPM | OXYGEN SATURATION: 97 % | BODY MASS INDEX: 25.8 KG/M2 | WEIGHT: 184.31 LBS

## 2023-06-26 DIAGNOSIS — I25.10 CORONARY ATHEROSCLEROSIS OF NATIVE CORONARY ARTERY: ICD-10-CM

## 2023-06-26 LAB
POCT GLUCOSE: 113 MG/DL (ref 70–110)
POCT GLUCOSE: 246 MG/DL (ref 70–110)

## 2023-06-26 PROCEDURE — 25000003 PHARM REV CODE 250: Performed by: INTERNAL MEDICINE

## 2023-06-26 PROCEDURE — C1894 INTRO/SHEATH, NON-LASER: HCPCS | Performed by: INTERNAL MEDICINE

## 2023-06-26 PROCEDURE — 25500020 PHARM REV CODE 255: Performed by: INTERNAL MEDICINE

## 2023-06-26 PROCEDURE — 99152 MOD SED SAME PHYS/QHP 5/>YRS: CPT | Performed by: INTERNAL MEDICINE

## 2023-06-26 PROCEDURE — 93454 CORONARY ARTERY ANGIO S&I: CPT | Performed by: INTERNAL MEDICINE

## 2023-06-26 PROCEDURE — C1769 GUIDE WIRE: HCPCS | Performed by: INTERNAL MEDICINE

## 2023-06-26 PROCEDURE — 63600175 PHARM REV CODE 636 W HCPCS: Performed by: INTERNAL MEDICINE

## 2023-06-26 PROCEDURE — C1760 CLOSURE DEV, VASC: HCPCS | Performed by: INTERNAL MEDICINE

## 2023-06-26 DEVICE — CATH VASCADE VCS 5FR: Type: IMPLANTABLE DEVICE | Site: GROIN | Status: FUNCTIONAL

## 2023-06-26 RX ORDER — SODIUM CHLORIDE 9 MG/ML
INJECTION, SOLUTION INTRAVENOUS CONTINUOUS
Status: DISCONTINUED | OUTPATIENT
Start: 2023-06-26 | End: 2023-06-26 | Stop reason: HOSPADM

## 2023-06-26 RX ORDER — FENTANYL CITRATE 50 UG/ML
INJECTION, SOLUTION INTRAMUSCULAR; INTRAVENOUS
Status: DISCONTINUED | OUTPATIENT
Start: 2023-06-26 | End: 2023-06-26 | Stop reason: HOSPADM

## 2023-06-26 RX ORDER — MIDAZOLAM HYDROCHLORIDE 1 MG/ML
INJECTION INTRAMUSCULAR; INTRAVENOUS
Status: DISCONTINUED | OUTPATIENT
Start: 2023-06-26 | End: 2023-06-26 | Stop reason: HOSPADM

## 2023-06-26 RX ORDER — ACETAMINOPHEN 325 MG/1
650 TABLET ORAL EVERY 4 HOURS PRN
Status: DISCONTINUED | OUTPATIENT
Start: 2023-06-26 | End: 2023-06-26 | Stop reason: HOSPADM

## 2023-06-26 RX ORDER — HYDRALAZINE HYDROCHLORIDE 20 MG/ML
10 INJECTION INTRAMUSCULAR; INTRAVENOUS EVERY 4 HOURS PRN
Status: DISCONTINUED | OUTPATIENT
Start: 2023-06-26 | End: 2023-06-26 | Stop reason: HOSPADM

## 2023-06-26 RX ORDER — ONDANSETRON 4 MG/1
8 TABLET, ORALLY DISINTEGRATING ORAL EVERY 8 HOURS PRN
Status: DISCONTINUED | OUTPATIENT
Start: 2023-06-26 | End: 2023-06-26 | Stop reason: HOSPADM

## 2023-06-26 RX ORDER — SODIUM CHLORIDE 9 MG/ML
INJECTION, SOLUTION INTRAVENOUS ONCE
Status: ACTIVE | OUTPATIENT
Start: 2023-06-26

## 2023-06-26 RX ORDER — LIDOCAINE HYDROCHLORIDE 10 MG/ML
INJECTION INFILTRATION; PERINEURAL
Status: DISCONTINUED | OUTPATIENT
Start: 2023-06-26 | End: 2023-06-26 | Stop reason: HOSPADM

## 2023-06-26 RX ORDER — MORPHINE SULFATE 4 MG/ML
2 INJECTION, SOLUTION INTRAMUSCULAR; INTRAVENOUS EVERY 4 HOURS PRN
Status: DISCONTINUED | OUTPATIENT
Start: 2023-06-26 | End: 2023-06-26 | Stop reason: HOSPADM

## 2023-06-26 RX ORDER — HYDROCODONE BITARTRATE AND ACETAMINOPHEN 5; 325 MG/1; MG/1
1 TABLET ORAL EVERY 4 HOURS PRN
Status: DISCONTINUED | OUTPATIENT
Start: 2023-06-26 | End: 2023-06-26 | Stop reason: HOSPADM

## 2023-06-26 RX ORDER — DIPHENHYDRAMINE HCL 25 MG
50 CAPSULE ORAL
Status: DISPENSED | OUTPATIENT
Start: 2023-06-26

## 2023-06-26 RX ORDER — SODIUM CHLORIDE 0.9 % (FLUSH) 0.9 %
10 SYRINGE (ML) INJECTION
Status: ACTIVE | OUTPATIENT
Start: 2023-06-26

## 2023-06-26 RX ORDER — DIAZEPAM 5 MG/1
10 TABLET ORAL ONCE
Status: COMPLETED | OUTPATIENT
Start: 2023-06-26 | End: 2023-06-26

## 2023-06-26 RX ADMIN — DIAZEPAM 10 MG: 5 TABLET ORAL at 06:06

## 2023-06-26 RX ADMIN — DIPHENHYDRAMINE HYDROCHLORIDE 50 MG: 25 CAPSULE ORAL at 06:06

## 2023-06-26 NOTE — Clinical Note
The catheter was inserted into the and was removed from the aorta. The catheter was unable to engage the area..  JL4

## 2023-06-26 NOTE — Clinical Note
The catheter was inserted into the and was removed from the ostium   right coronary artery. An angiography was performed of the right coronary arteries. Multiple views were taken. The angiography was performed via power injection.    ADI

## 2023-06-26 NOTE — Clinical Note
The catheter was inserted into the and was removed from the ostium   left main. An angiography was performed of the left coronary arteries. Multiple views were taken. The angiography was performed via power injection.

## 2023-06-26 NOTE — Clinical Note
The catheter was inserted into the and was removed from the aorta. The catheter was unable to engage the area..  PIG

## 2023-06-26 NOTE — DISCHARGE INSTRUCTIONS
-Remove dressing in 24hrs  -No driving for two Days  -Do not lift anything heavier than a gallon of milk for 5 days  -No tub bathing for 5 days (if you have a groin site).   -No lotions, powders, creams around site for 5 days  - Return to the nearest emergency room if you start running a fever; have any kind of discharge coming from the site, the site looks red or swollen.  - If site starts to bleed, lay flat on the ground, apply pressure to the site and call 911.

## 2023-06-26 NOTE — Clinical Note
The catheter was inserted into the and was removed from the aorta. The catheter was unable to engage the area..

## 2023-06-26 NOTE — NURSING
Patient scanned incorrectly. CBG result of 246 inaccurate. CBG of 113 is the correct reading for this patient.

## 2023-06-29 DIAGNOSIS — G25.81 RESTLESS LEGS SYNDROME: Primary | ICD-10-CM

## 2023-06-29 DIAGNOSIS — G25.81 RESTLESS LEGS SYNDROME: ICD-10-CM

## 2023-06-29 RX ORDER — PRAMIPEXOLE DIHYDROCHLORIDE 0.5 MG/1
.5-1 TABLET ORAL NIGHTLY
Qty: 180 TABLET | Refills: 0 | Status: SHIPPED | OUTPATIENT
Start: 2023-06-29 | End: 2023-11-17 | Stop reason: SDUPTHER

## 2023-06-29 RX ORDER — PRAMIPEXOLE DIHYDROCHLORIDE 0.5 MG/1
.5-1.5 TABLET ORAL NIGHTLY
Qty: 90 TABLET | Refills: 2 | Status: SHIPPED | OUTPATIENT
Start: 2023-06-29 | End: 2023-06-29 | Stop reason: SDUPTHER

## 2023-07-01 NOTE — DISCHARGE SUMMARY
Procedure(s) (LRB):  Left heart cath (Left)    OUTCOME: Patient tolerated treatment/procedure well without complication and is now ready for discharge.    DIAGNOSIS: CAD    DISPOSITION: Home or Self Care    FOLLOWUP: In clinic    DISCHARGE INSTRUCTIONS: No discharge procedures on file.    TIME SPENT ON DISCHARGE:   31 minutes

## 2023-07-19 DIAGNOSIS — E11.9 DIABETES MELLITUS WITHOUT COMPLICATION: Primary | ICD-10-CM

## 2023-07-19 DIAGNOSIS — D64.9 ANEMIA, UNSPECIFIED TYPE: ICD-10-CM

## 2023-07-19 DIAGNOSIS — Z79.899 ENCOUNTER FOR LONG-TERM (CURRENT) USE OF OTHER MEDICATIONS: ICD-10-CM

## 2023-07-19 PROCEDURE — 83036 HEMOGLOBIN GLYCOSYLATED A1C: CPT | Performed by: FAMILY MEDICINE

## 2023-07-19 PROCEDURE — 82607 VITAMIN B-12: CPT | Performed by: FAMILY MEDICINE

## 2023-07-19 PROCEDURE — 82728 ASSAY OF FERRITIN: CPT | Performed by: FAMILY MEDICINE

## 2023-07-19 PROCEDURE — 82746 ASSAY OF FOLIC ACID SERUM: CPT | Performed by: FAMILY MEDICINE

## 2023-07-19 PROCEDURE — 83540 ASSAY OF IRON: CPT | Performed by: FAMILY MEDICINE

## 2023-08-15 ENCOUNTER — OFFICE VISIT (OUTPATIENT)
Dept: FAMILY MEDICINE | Facility: CLINIC | Age: 83
End: 2023-08-15
Payer: MEDICARE

## 2023-08-15 VITALS
WEIGHT: 181.38 LBS | TEMPERATURE: 97 F | OXYGEN SATURATION: 96 % | DIASTOLIC BLOOD PRESSURE: 80 MMHG | HEIGHT: 71 IN | SYSTOLIC BLOOD PRESSURE: 130 MMHG | BODY MASS INDEX: 25.39 KG/M2 | HEART RATE: 82 BPM

## 2023-08-15 DIAGNOSIS — E11.9 TYPE 2 DIABETES MELLITUS WITHOUT COMPLICATION, WITHOUT LONG-TERM CURRENT USE OF INSULIN: Primary | ICD-10-CM

## 2023-08-15 DIAGNOSIS — I50.9 CONGESTIVE HEART FAILURE, UNSPECIFIED HF CHRONICITY, UNSPECIFIED HEART FAILURE TYPE: ICD-10-CM

## 2023-08-15 DIAGNOSIS — D62 ANEMIA ASSOCIATED WITH ACUTE BLOOD LOSS: ICD-10-CM

## 2023-08-15 DIAGNOSIS — I48.0 PAF (PAROXYSMAL ATRIAL FIBRILLATION): ICD-10-CM

## 2023-08-15 DIAGNOSIS — I10 ESSENTIAL HYPERTENSION, BENIGN: ICD-10-CM

## 2023-08-15 PROCEDURE — 99214 OFFICE O/P EST MOD 30 MIN: CPT | Mod: ,,, | Performed by: FAMILY MEDICINE

## 2023-08-15 PROCEDURE — 99214 PR OFFICE/OUTPT VISIT, EST, LEVL IV, 30-39 MIN: ICD-10-PCS | Mod: ,,, | Performed by: FAMILY MEDICINE

## 2023-08-15 RX ORDER — DILTIAZEM HYDROCHLORIDE 180 MG/1
180 CAPSULE, EXTENDED RELEASE ORAL DAILY
COMMUNITY
Start: 2023-07-17

## 2023-08-15 RX ORDER — ATORVASTATIN CALCIUM 20 MG/1
20 TABLET, FILM COATED ORAL NIGHTLY
COMMUNITY
Start: 2023-07-17

## 2023-08-15 RX ORDER — SACUBITRIL AND VALSARTAN 24; 26 MG/1; MG/1
1 TABLET, FILM COATED ORAL 2 TIMES DAILY
COMMUNITY
Start: 2023-08-09

## 2023-08-15 NOTE — PROGRESS NOTES
Patient Name: Rico Nagel     Patient ID: [id number]     Chief Complaint: Results (Here for lab results.)      HPI:     Rico Nagel is a 83 y.o. male here today for lab results.  He tells me that he has been feeling fine.  It should be noted that this patient has a history of atrial fibrillation and recently underwent an AMULET procedure.  As a result he was no longer on anticoagulants.  .  Past Medical History:   Diagnosis Date    Anxiety disorder, unspecified     Arthritis     Atrial fibrillation     Carotid artery stenosis     Coronary artery disease     Diabetes mellitus     Eczema     GI bleeding     History of kidney stones     Hypertension     Insomnia     Iron deficiency anemia, unspecified     PAD (peripheral artery disease)     Restless leg syndrome     Sleep apnea     does not use CPAP since recall    Unspecified glaucoma     Vitamin D deficiency         Past Surgical History:   Procedure Laterality Date    CARDIOVERSION  08/08/2022    CLOSURE OF LEFT ATRIAL APPENDAGE USING DEVICE N/A 03/28/2023    Procedure: Left atrial appendage closure device;  Surgeon: Ayo Colon MD;  Location: [facility] CATH LAB;  Service: Cardiology;  Laterality: N/A;  AMULET W/ INTRA OP DELORIS    COLONOSCOPY      CORONARY ANGIOPLASTY WITH STENT PLACEMENT N/A 01/2015    LO to LAD & BMS to RPLA    CYSTOSCOPY W/ STONE MANIPULATION N/A     ECHOCARDIOGRAM, TRANSESOPHAGEAL  05/04/2023    Dr. Colon    ECHOCARDIOGRAM,TRANSESOPHAGEAL N/A 03/28/2023    Procedure: Transesophageal echo (DELORIS) intra-procedure log documentation;  Surgeon: Pee Diagnostic Provider;  Location: [facility] CATH LAB;  Service: Cardiology;  Laterality: N/A;    ESOPHAGOGASTRODUODENOSCOPY  08/10/2022    EXTERNAL EAR SURGERY Left 11/2023    EYE SURGERY Bilateral     Cataract & Glaucoma    KIDNEY STONE SURGERY      KNEE ARTHROPLASTY Left     LEFT HEART CATHETERIZATION Left 06/26/2023    Procedure: Left heart cath;  Surgeon: Laura Johnson MD;  Location: [facility] CATH LAB;   Service: Cardiology;  Laterality: Left;  LHC VIA RT GROIN    PERCUTANEOUS NEPHROLITHOTOMY          Social History     Socioeconomic History    Marital status:     Number of children: 1   Tobacco Use    Smoking status: Some Days     Types: Cigars    Smokeless tobacco: Never   Substance and Sexual Activity    Alcohol use: Yes     Comment: 3x week    Drug use: Never    Sexual activity: Not Currently     Social Determinants of Health     Financial Resource Strain: Low Risk  (6/19/2023)    Overall Financial Resource Strain (CARDIA)     Difficulty of Paying Living Expenses: Not very hard   Food Insecurity: No Food Insecurity (6/19/2023)    Hunger Vital Sign     Worried About Running Out of Food in the Last Year: Never true     Ran Out of Food in the Last Year: Never true   Transportation Needs: No Transportation Needs (6/19/2023)    PRAPARE - Transportation     Lack of Transportation (Medical): No     Lack of Transportation (Non-Medical): No   Physical Activity: Insufficiently Active (6/19/2023)    Exercise Vital Sign     Days of Exercise per Week: 1 day     Minutes of Exercise per Session: 10 min   Stress: No Stress Concern Present (6/19/2023)    [employer] - Occupational Stress Questionnaire     Feeling of Stress : Not at all   Social Connections: Moderately Integrated (6/19/2023)    Social Connection and Isolation Panel [NHANES]     Frequency of Communication with Friends and Family: More than three times a week     Frequency of Social Gatherings with Friends and Family: More than three times a week     Attends Scientology Services: 1 to 4 times per year     Active Member of Clubs or Organizations: No     Attends Club or Organization Meetings: 1 to 4 times per year     Marital Status:    Housing Stability: Low Risk  (6/19/2023)    Housing Stability Vital Sign     Unable to Pay for Housing in the Last Year: No     Number of Places Lived in the Last Year: 1     Unstable Housing  in the Last Year: No        Current Outpatient Medications   Medication Instructions    ALPRAZolam (XANAX) 0.5 mg, Oral, Nightly PRN    aspirin (ECOTRIN) 81 mg, Oral, Daily    atorvastatin (LIPITOR) 20 mg, Oral, Nightly    cholecalciferol (vitamin D3) 5,000 Units, Oral, Daily    CONTOUR NEXT TEST STRIPS Strp USE TO TEST BLOOD GLUCOSE ONCE A DAY    cyanocobalamin, vitamin B-12, (VITAMIN B-12 ORAL) 1 tablet, Oral, Nightly, 1000mcg    diltiaZEM (TIAZAC) 180 mg, Oral, Daily    ENTRESTO 24-26 mg per tablet 1 tablet, Oral, 2 times daily    ferrous sulfate (FEOSOL) Tab tablet 1 tablet, Oral, With breakfast    furosemide (LASIX) 20 mg, Oral, 2 times daily    metoprolol succinate (TOPROL-XL) 25 mg, Oral, 2 times daily    mv-mn/om3/dha/epa/fish/lut/nasima (OCUVITE ADULT 50 PLUS ORAL) 1 tablet, Oral, Nightly    pantoprazole (PROTONIX) 40 mg, Oral, Daily    pramipexole (MIRAPEX) 0.5-1 mg, Oral, Nightly    tadalafiL (CIALIS) 10 mg, Oral, Daily PRN    ZIOPTAN, PF, 0.0015 % Dpet 1 drop, Right Eye, Nightly       Review of patient's allergies indicates:   Allergen Reactions    Aspirin Other (See Comments)     Can't take 325 mg d/t GI Bleed, can take 81 mg    Eliquis [apixaban] Other (See Comments)     GI Bleed; severe bleeding from nose & mouth        Immunization History   Administered Date(s) Administered    COVID-19, MRNA, LN-S, PF (Pfizer) (Purple Cap) 01/27/2021, 02/24/2021       Patient Care Team:  Leon Vaughan Sr., MD as PCP - General (Family Medicine)  Laura Johnson MD as Consulting Physician (Cardiology)  Ayo Colon MD as Consulting Physician (Cardiology)  Brielle Phelps MD as Consulting Physician (Ophthalmology)     Subjective:     Review of Systems    10 point review of systems conducted, negative except as stated in the history of present illness. See HPI for details.    Objective:     Visit Vitals  /80 (BP Location: Left arm, Patient Position: Sitting, BP Method: Medium (Manual))   Pulse 82   Temp  "97 °F (36.1 °C)   Ht 5' 11" (1.803 m)   Wt 82.3 kg (181 lb 6.4 oz)   SpO2 96%   BMI 25.30 kg/m²       Physical Exam  Constitutional:       Appearance: Normal appearance.   HENT:      Head: Normocephalic and atraumatic.   Cardiovascular:      Rate and Rhythm: Normal rate. Rhythm irregular.      Heart sounds: Normal heart sounds.   Pulmonary:      Effort: Pulmonary effort is normal.      Breath sounds: Normal breath sounds.   Abdominal:      General: Abdomen is flat.      Palpations: Abdomen is soft.      Tenderness: There is no abdominal tenderness.   Musculoskeletal:         General: No swelling or tenderness. Normal range of motion.      Cervical back: Normal range of motion and neck supple.      Right lower leg: No edema.      Left lower leg: No edema.   Lymphadenopathy:      Cervical: No cervical adenopathy.   Skin:     General: Skin is warm and dry.   Neurological:      General: No focal deficit present.      Mental Status: He is alert and oriented to person, place, and time.   Psychiatric:         Mood and Affect: Mood normal.           Assessment:       ICD-10-CM ICD-9-CM   1. Type 2 diabetes mellitus without complication, without long-term current use of insulin  E11.9 250.00   2. Essential hypertension, benign  I10 401.1   3. PAF (paroxysmal atrial fibrillation)  I48.0 427.31   4. Congestive heart failure, unspecified HF chronicity, unspecified heart failure type  I50.9 428.0   5. Anemia associated with acute blood loss  D62 285.1        Plan:     1. Type 2 diabetes mellitus without complication, without long-term current use of insulin  Overview:  Patient is diet controlled.  Follow ADA Diet. Avoid soda, simple sweets, and limit rice/pasta/breads/starches (no more than 45-50 grams per meal).  Maintain healthy weight with goal BMI <30.  Exercise 5 times per week for 30 minutes per day.  Stressed importance of daily foot exams.  Stressed importance of annual dilated eye exam.    Assessment & Plan:  Patient's " last A1c was 6.0 in January of this year.  We will continue to monitor.    Orders:  -     Hemoglobin A1C; Future; Expected date: 12/15/2023    2. Essential hypertension, benign  Overview:  Hypertension Medications               ENTRESTO 24-26 mg per tablet Take 1 tablet by mouth 2 (two) times daily.    metoprolol succinate (TOPROL-XL) 25 MG 24 hr tablet Take 25 mg by mouth 2 (two) times daily.    diltiaZEM (TIAZAC) 180 MG Cs24 Take 180 mg by mouth Daily.    furosemide (LASIX) 20 MG tablet Take 20 mg by mouth 2 (two) times a day.        Followed by Cardiology.  Low Sodium Diet (DASH Diet - Less than 2 grams of sodium per day).  Monitor blood pressure daily and log. Report consistent numbers greater than 140/90.  Maintain healthy weight with goal BMI <30. Exercise 30 minutes per day, 5 days per week.    Assessment & Plan:  Patient's blood pressure is at goal.    Orders:  -     Comprehensive Metabolic Panel; Future; Expected date: 12/15/2023  -     CBC Auto Differential; Future; Expected date: 12/15/2023  -     Lipid Panel; Future; Expected date: 12/15/2023  -     TSH; Future; Expected date: 12/15/2023    3. PAF (paroxysmal atrial fibrillation)  Overview:  Patient recently underwent an Amulet procedure to obviate the need for anticoagulation.    Followed by Cardiology.      4. Congestive heart failure, unspecified HF chronicity, unspecified heart failure type  Overview:  Continue present med tx.  Patient's medications are listed above under the heading of hypertension.  Notify the clinic if you gain 3 or more pounds in one day or 5 or more pounds in one week.  Stressed importance of daily morning weights after urination but prior to breakfast on the same scale.  Low Sodium Diet (Dash Diet - less than 2 grams of sodium per day).  Follow a low cholesterol, low saturated fat diet with less that 200mg of cholesterol a day.  Cut down on alcohol if you have more than 1 drink a day (for women) or 2 drinks a day (for  men).  Maintain healthy weight with goal BMI <30. Perform 30 minutes of daily physical activity 5 days per week.  Report to ER for chest pain, SOB, difficulty breathing, abdominal distention or significant edema to lower extremities.  Continue following up with Cardiology -    Assessment & Plan:  Patient's CHF is well controlled.  He will maintain follow-up with Cardiology.    Orders:  -     BNP; Future; Expected date: 12/15/2023    5. Anemia associated with acute blood loss  Comments:  Patient recently had a CBC which showed a normal H&H however the MCV was elevated.  We followed this up with a B12 and folate level which are both normal.  Overview:  H/H stable at this time . Will continue to monitor    Assessment & Plan:  B12,Iron,Ferritin and Folate results reviewed and within normal limits.            [x] Discussed lab findings with the patient.  [x] Discussed diet, exercise and if appropriate, weight loss.  [] Instructions and information, including risks and benefits of prescribed medication(s) have been reviewed with the patient and patient verbalizes understanding. Questions have been answered to the patient's satisfaction.  [] Appropriate counseling has been given regarding anxiety and depressive issues that were discussed today.  [] Any lab drawn today will be reviewed by physician at the time it is received and appropriate recommendations bill be made and discussed with patient.     Follow up in about 4 months (around 12/15/2023) for With Fasting Labs prior to visit.   In addition to their scheduled follow up, the patient has also been instructed to follow up on as needed basis.     Future Appointments   Date Time Provider Department Center   3/19/2024  1:30 PM Leon Vaughan Sr., MD [facility] [place] Leonard Jb Bourgeois Sr, MD

## 2023-08-24 DIAGNOSIS — E11.9 TYPE 2 DIABETES MELLITUS WITHOUT COMPLICATION, WITHOUT LONG-TERM CURRENT USE OF INSULIN: Primary | ICD-10-CM

## 2023-08-24 RX ORDER — EMPAGLIFLOZIN 10 MG/1
10 TABLET, FILM COATED ORAL NIGHTLY
Qty: 90 TABLET | Refills: 2 | Status: SHIPPED | OUTPATIENT
Start: 2023-08-24 | End: 2023-12-19

## 2023-08-29 LAB
LEFT EYE DM RETINOPATHY: NORMAL
RIGHT EYE DM RETINOPATHY: NORMAL

## 2023-09-15 PROBLEM — G47.00 INSOMNIA: Status: ACTIVE | Noted: 2023-09-15

## 2023-09-15 PROBLEM — E78.2 MIXED HYPERLIPIDEMIA: Status: ACTIVE | Noted: 2023-09-15

## 2023-09-15 PROBLEM — E11.9 DIABETES MELLITUS: Status: ACTIVE | Noted: 2023-09-15

## 2023-09-15 PROBLEM — I73.9 PAD (PERIPHERAL ARTERY DISEASE): Status: ACTIVE | Noted: 2023-09-15

## 2023-09-15 PROBLEM — G25.81 RESTLESS LEG SYNDROME: Status: ACTIVE | Noted: 2023-09-15

## 2023-09-15 PROBLEM — I25.10 CORONARY ARTERY DISEASE: Status: ACTIVE | Noted: 2023-09-15

## 2023-09-15 PROBLEM — F41.9 ANXIETY DISORDER, UNSPECIFIED: Status: ACTIVE | Noted: 2023-09-15

## 2023-09-15 PROBLEM — I48.91 ATRIAL FIBRILLATION: Status: ACTIVE | Noted: 2023-09-15

## 2023-09-15 PROBLEM — I10 HYPERTENSION: Status: ACTIVE | Noted: 2023-09-15

## 2023-09-15 PROBLEM — I65.29 CAROTID ARTERY STENOSIS: Status: ACTIVE | Noted: 2023-09-15

## 2023-10-09 DIAGNOSIS — F41.9 ANXIETY: ICD-10-CM

## 2023-10-09 NOTE — TELEPHONE ENCOUNTER
Last Rx written on 6/19/23   # 30  X 1      Last appt   8/15/23      Next appt 11/15/23   Lab    Next appt 11/21/23   Lab results

## 2023-10-10 NOTE — TELEPHONE ENCOUNTER
Spoke with spouse in regards to ear and let her know to have Mr. Gómez come in so the doctor can look at his ear and decide whether it can be done here in office or will require a referral.

## 2023-10-10 NOTE — TELEPHONE ENCOUNTER
----- Message from Leon Vaughan Sr., MD sent at 10/10/2023  7:54 AM CDT -----  Regarding: RE: STYE ON LEFT EAR  I would have to look at it again because I do not remember how big it is  ----- Message -----  From: Belem Temple MA  Sent: 10/9/2023   2:48 PM CDT  To: Leon Vaughan Sr., MD  Subject: FW: STYE ON LEFT EAR                               ----- Message -----  From: Lois Hamlin  Sent: 10/9/2023   2:32 PM CDT  To: [facility] Support Staff  Subject: STYE ON LEFT EAR                                 HE SAID THAT HE IS READY TO REMOVE THE STYE FROM HIS LEFT EAR, WHAT DOES HE HAVE ON HIS EAR AND CAN IT BE REMOVED HERE?

## 2023-10-11 RX ORDER — ALPRAZOLAM 0.5 MG/1
0.5 TABLET ORAL NIGHTLY PRN
Qty: 30 TABLET | Refills: 1 | Status: SHIPPED | OUTPATIENT
Start: 2023-10-11 | End: 2023-12-19 | Stop reason: SDUPTHER

## 2023-10-12 ENCOUNTER — OFFICE VISIT (OUTPATIENT)
Dept: FAMILY MEDICINE | Facility: CLINIC | Age: 83
End: 2023-10-12
Payer: MEDICARE

## 2023-10-12 VITALS
HEIGHT: 71 IN | RESPIRATION RATE: 20 BRPM | TEMPERATURE: 97 F | WEIGHT: 184 LBS | BODY MASS INDEX: 25.76 KG/M2 | HEART RATE: 92 BPM | SYSTOLIC BLOOD PRESSURE: 126 MMHG | DIASTOLIC BLOOD PRESSURE: 84 MMHG | OXYGEN SATURATION: 98 %

## 2023-10-12 DIAGNOSIS — H93.90 LESION OF EAR: Primary | ICD-10-CM

## 2023-10-12 PROBLEM — H61.92 SKIN LESION OF LEFT EAR: Status: ACTIVE | Noted: 2023-10-12

## 2023-10-12 PROCEDURE — 99213 OFFICE O/P EST LOW 20 MIN: CPT | Mod: ,,, | Performed by: FAMILY MEDICINE

## 2023-10-12 PROCEDURE — 99213 PR OFFICE/OUTPT VISIT, EST, LEVL III, 20-29 MIN: ICD-10-PCS | Mod: ,,, | Performed by: FAMILY MEDICINE

## 2023-10-12 NOTE — PROGRESS NOTES
Patient Name: Rico Nagel     Patient ID: [id number]     Chief Complaint: Ear Problem (Patient complains of a stye on his left ear.)      HPI:     Rico Nagel is a 83 y.o. male here today for a skin growth on his (L) ear.    Past Medical History:   Diagnosis Date    Anxiety disorder, unspecified     Arthritis     Atrial fibrillation     Carotid artery stenosis     Coronary artery disease     Diabetes mellitus     Eczema     GI bleeding     History of kidney stones     Hypertension     Insomnia     Iron deficiency anemia, unspecified     PAD (peripheral artery disease)     Restless leg syndrome     Sleep apnea     does not use CPAP since recall    Unspecified glaucoma     Vitamin D deficiency         Past Surgical History:   Procedure Laterality Date    CARDIOVERSION  08/08/2022    CLOSURE OF LEFT ATRIAL APPENDAGE USING DEVICE N/A 03/28/2023    Procedure: Left atrial appendage closure device;  Surgeon: Ayo Colon MD;  Location: [facility] CATH LAB;  Service: Cardiology;  Laterality: N/A;  AMULET W/ INTRA OP DELORIS    COLONOSCOPY      CORONARY ANGIOPLASTY WITH STENT PLACEMENT N/A 01/2015    LO to LAD & BMS to RPLA    CYSTOSCOPY W/ STONE MANIPULATION N/A     ECHOCARDIOGRAM, TRANSESOPHAGEAL  05/04/2023    Dr. Colon    ECHOCARDIOGRAM,TRANSESOPHAGEAL N/A 03/28/2023    Procedure: Transesophageal echo (DELORIS) intra-procedure log documentation;  Surgeon: Pee Diagnostic Provider;  Location: [facility] CATH LAB;  Service: Cardiology;  Laterality: N/A;    ESOPHAGOGASTRODUODENOSCOPY  08/10/2022    EYE SURGERY Bilateral     Cataract & Glaucoma    KIDNEY STONE SURGERY      KNEE ARTHROPLASTY Left     LEFT HEART CATHETERIZATION Left 6/26/2023    Procedure: Left heart cath;  Surgeon: Laura Johnson MD;  Location: [facility] CATH LAB;  Service: Cardiology;  Laterality: Left;  LHC VIA RT GROIN    PERCUTANEOUS NEPHROLITHOTOMY          Social History     Socioeconomic History    Marital status:     Number of children: 1   Tobacco Use     Smoking status: Some Days     Types: Cigars    Smokeless tobacco: Never   Substance and Sexual Activity    Alcohol use: Yes     Comment: 3x week    Drug use: Never    Sexual activity: Not Currently     Social Determinants of Health     Financial Resource Strain: Low Risk  (6/19/2023)    Overall Financial Resource Strain (CARDIA)     Difficulty of Paying Living Expenses: Not very hard   Food Insecurity: No Food Insecurity (6/19/2023)    Hunger Vital Sign     Worried About Running Out of Food in the Last Year: Never true     Ran Out of Food in the Last Year: Never true   Transportation Needs: No Transportation Needs (6/19/2023)    PRAPARE - Transportation     Lack of Transportation (Medical): No     Lack of Transportation (Non-Medical): No   Physical Activity: Insufficiently Active (6/19/2023)    Exercise Vital Sign     Days of Exercise per Week: 1 day     Minutes of Exercise per Session: 10 min   Stress: No Stress Concern Present (6/19/2023)    [employer] - Occupational Stress Questionnaire     Feeling of Stress : Not at all   Social Connections: Moderately Integrated (6/19/2023)    Social Connection and Isolation Panel [NHANES]     Frequency of Communication with Friends and Family: More than three times a week     Frequency of Social Gatherings with Friends and Family: More than three times a week     Attends Scientologist Services: 1 to 4 times per year     Active Member of Clubs or Organizations: No     Attends Club or Organization Meetings: 1 to 4 times per year     Marital Status:    Housing Stability: Low Risk  (6/19/2023)    Housing Stability Vital Sign     Unable to Pay for Housing in the Last Year: No     Number of Places Lived in the Last Year: 1     Unstable Housing in the Last Year: No        Current Outpatient Medications   Medication Instructions    ALPRAZolam (XANAX) 0.5 mg, Oral, Nightly PRN    aspirin (ECOTRIN) 81 mg, Oral, Daily    atorvastatin (LIPITOR) 20 mg,  "Oral, Nightly    cholecalciferol (vitamin D3) 5,000 Units, Oral, Daily    clopidogreL (PLAVIX) 75 mg, Oral, Daily    CONTOUR NEXT TEST STRIPS Strp USE TO TEST BLOOD GLUCOSE ONCE A DAY    cyanocobalamin, vitamin B-12, (VITAMIN B-12 ORAL) 1 tablet, Oral, Nightly, 1000mcg    diltiaZEM (TIAZAC) 180 mg, Oral, Daily    ENTRESTO 24-26 mg per tablet 1 tablet, Oral, 2 times daily    ferrous sulfate (FEOSOL) Tab tablet 1 tablet, Oral, With breakfast    furosemide (LASIX) 20 mg, Oral, 2 times daily    JARDIANCE 10 mg, Oral, Nightly    metoprolol succinate (TOPROL-XL) 25 mg, Oral, 2 times daily    mv-mn/om3/dha/epa/fish/lut/nasima (OCUVITE ADULT 50 PLUS ORAL) 1 tablet, Oral, Nightly    pantoprazole (PROTONIX) 40 mg, Oral, Daily    pramipexole (MIRAPEX) 0.5-1 mg, Oral, Nightly    tadalafiL (CIALIS) 10 mg, Oral, Daily PRN    ZIOPTAN, PF, 0.0015 % Dpet 1 drop, Right Eye, Nightly       Review of patient's allergies indicates:   Allergen Reactions    Aspirin Other (See Comments)     Can't take 325 mg d/t GI Bleed, can take 81 mg    Eliquis [apixaban] Other (See Comments)     GI Bleed; severe bleeding from nose & mouth        Immunization History   Administered Date(s) Administered    COVID-19, MRNA, LN-S, PF (Pfizer) (Purple Cap) 01/27/2021, 02/24/2021       Patient Care Team:  Leon Vaughan Sr., MD as PCP - General (Family Medicine)  Laura Johnson MD as Consulting Physician (Cardiology)  Ayo Colon MD as Consulting Physician (Cardiology)  Brielle Phelps MD as Consulting Physician (Ophthalmology)     Subjective:     Review of Systems    10 point review of systems conducted, negative except as stated in the history of present illness. See HPI for details.    Objective:     Visit Vitals  /84 (BP Location: Left arm, Patient Position: Sitting, BP Method: Medium (Manual))   Pulse 92   Temp 97.3 °F (36.3 °C)   Resp 20   Ht 5' 11" (1.803 m)   Wt 83.5 kg (184 lb)   SpO2 98%   BMI 25.66 kg/m²       Physical Exam  HENT: "      Head:        Comments: There is an approx 5mm round, raised, skin colored lesion on the anterior aspect of (L) earlobe.          Assessment:       ICD-10-CM ICD-9-CM   1. Lesion of (L) ear  H93.90 388.9        Plan:     1. Lesion of (L) ear  Comments:  Nature and behavior of this lesion is unknown at this time.  We will refer to ENT for excisional biopsy.        [] Discussed lab findings with the patient.  [] Discussed diet, exercise and if appropriate, weight loss.  [] Instructions and information, including risks and benefits of prescribed medication(s) have been reviewed with the patient and patient verbalizes understanding. Questions have been answered to the patient's satisfaction.  [] Appropriate counseling has been given regarding anxiety and depressive issues that were discussed today.  [] Any lab drawn today will be reviewed by physician at the time it is received and appropriate recommendations bill be made and discussed with patient.     Follow up if symptoms worsen or fail to improve.   In addition to their scheduled follow up, the patient has also been instructed to follow up on as needed basis.     Future Appointments   Date Time Provider Department Center   11/15/2023  8:00 AM NURSE, [facility] CINTH[facility]   11/21/2023 10:30 AM Leon Vaughan Sr., MD [facility] Vaughan Sr, MD

## 2023-10-20 ENCOUNTER — TELEPHONE (OUTPATIENT)
Dept: FAMILY MEDICINE | Facility: CLINIC | Age: 83
End: 2023-10-20
Payer: MEDICARE

## 2023-10-20 NOTE — TELEPHONE ENCOUNTER
----- Message from Aracelis Culp sent at 10/18/2023  2:38 PM CDT -----  Regarding: Test strips  Bud with [facility] pharmacy wants to know what test strips does Rico Nagel use.

## 2023-10-20 NOTE — TELEPHONE ENCOUNTER
Left message for wife to call pharmacy and let them know exactly what kind of test strips Mr. Gómez uses. We have no test strips listed to be able to give that information.   Pt AAOx4. Family at the bedside. Medications administered per order. Cardiac monitor maintained. NPO at midnight. 2L of PRBC's infusion completed. Pt tolerated well. Teds maintained. Ambulates to restroom with assist/standby, voids spontaneously. Pt verbalized understanding of needing a stool sample. Encouraged to call with questions/concerns/assistance. Will continue to monitor. Safety maintained.

## 2023-11-17 DIAGNOSIS — G25.81 RESTLESS LEGS SYNDROME: ICD-10-CM

## 2023-11-17 RX ORDER — PRAMIPEXOLE DIHYDROCHLORIDE 0.5 MG/1
.5-1 TABLET ORAL NIGHTLY
Qty: 180 TABLET | Refills: 0 | Status: SHIPPED | OUTPATIENT
Start: 2023-11-17 | End: 2023-12-19 | Stop reason: SDUPTHER

## 2023-12-11 PROCEDURE — 82607 VITAMIN B-12: CPT | Performed by: FAMILY MEDICINE

## 2023-12-11 PROCEDURE — 84443 ASSAY THYROID STIM HORMONE: CPT | Performed by: FAMILY MEDICINE

## 2023-12-11 PROCEDURE — 83880 ASSAY OF NATRIURETIC PEPTIDE: CPT | Performed by: FAMILY MEDICINE

## 2023-12-11 PROCEDURE — 83036 HEMOGLOBIN GLYCOSYLATED A1C: CPT | Performed by: FAMILY MEDICINE

## 2023-12-11 PROCEDURE — 80061 LIPID PANEL: CPT | Performed by: FAMILY MEDICINE

## 2023-12-11 PROCEDURE — 82746 ASSAY OF FOLIC ACID SERUM: CPT | Performed by: FAMILY MEDICINE

## 2023-12-11 PROCEDURE — 80053 COMPREHEN METABOLIC PANEL: CPT | Performed by: FAMILY MEDICINE

## 2023-12-11 PROCEDURE — 85025 COMPLETE CBC W/AUTO DIFF WBC: CPT | Performed by: FAMILY MEDICINE

## 2023-12-19 ENCOUNTER — OFFICE VISIT (OUTPATIENT)
Dept: FAMILY MEDICINE | Facility: CLINIC | Age: 83
End: 2023-12-19
Payer: MEDICARE

## 2023-12-19 VITALS
DIASTOLIC BLOOD PRESSURE: 85 MMHG | TEMPERATURE: 97 F | WEIGHT: 186 LBS | RESPIRATION RATE: 18 BRPM | OXYGEN SATURATION: 98 % | BODY MASS INDEX: 26.04 KG/M2 | HEART RATE: 92 BPM | HEIGHT: 71 IN | SYSTOLIC BLOOD PRESSURE: 140 MMHG

## 2023-12-19 DIAGNOSIS — N52.9 ERECTILE DYSFUNCTION, UNSPECIFIED ERECTILE DYSFUNCTION TYPE: ICD-10-CM

## 2023-12-19 DIAGNOSIS — G25.81 RLS (RESTLESS LEGS SYNDROME): ICD-10-CM

## 2023-12-19 DIAGNOSIS — F41.9 ANXIETY: ICD-10-CM

## 2023-12-19 DIAGNOSIS — E11.9 TYPE 2 DIABETES MELLITUS WITHOUT COMPLICATION, WITHOUT LONG-TERM CURRENT USE OF INSULIN: Primary | ICD-10-CM

## 2023-12-19 DIAGNOSIS — I48.0 PAF (PAROXYSMAL ATRIAL FIBRILLATION): ICD-10-CM

## 2023-12-19 PROBLEM — I10 HYPERTENSION: Status: RESOLVED | Noted: 2023-09-15 | Resolved: 2023-12-19

## 2023-12-19 PROBLEM — H61.92 SKIN LESION OF LEFT EAR: Status: RESOLVED | Noted: 2023-10-12 | Resolved: 2023-12-19

## 2023-12-19 PROCEDURE — 99214 PR OFFICE/OUTPT VISIT, EST, LEVL IV, 30-39 MIN: ICD-10-PCS | Mod: ,,, | Performed by: FAMILY MEDICINE

## 2023-12-19 PROCEDURE — 99214 OFFICE O/P EST MOD 30 MIN: CPT | Mod: ,,, | Performed by: FAMILY MEDICINE

## 2023-12-19 RX ORDER — TADALAFIL 10 MG/1
10 TABLET ORAL DAILY PRN
Qty: 5 TABLET | Refills: 2 | Status: SHIPPED | OUTPATIENT
Start: 2023-12-19

## 2023-12-19 RX ORDER — ALPRAZOLAM 0.5 MG/1
0.5 TABLET ORAL NIGHTLY PRN
Qty: 30 TABLET | Refills: 2 | Status: SHIPPED | OUTPATIENT
Start: 2023-12-19

## 2023-12-19 RX ORDER — PRAMIPEXOLE DIHYDROCHLORIDE 0.5 MG/1
.5-1 TABLET ORAL NIGHTLY
Qty: 180 TABLET | Refills: 0 | Status: SHIPPED | OUTPATIENT
Start: 2023-12-19 | End: 2024-03-18

## 2023-12-19 NOTE — LETTER
AUTHORIZATION FOR RELEASE OF   CONFIDENTIAL INFORMATION    Dear Dr. Phelps,    We are seeing Rico Nagel, date of birth 1940, in the clinic at [facility]. Leon Vaughan Sr., MD is the patient's PCP. Rico Nagel has an outstanding lab/procedure at the time we reviewed his chart. In order to help keep his health information updated, he has authorized us to request the following medical record(s):        (  )  MAMMOGRAM                                      (  )  COLONOSCOPY      (  )  PAP SMEAR                                          (  )  OUTSIDE LAB RESULTS     (  )  DEXA SCAN                                          ( X )  EYE EXAM            (  )  FOOT EXAM                                          (  )  ENTIRE RECORD     (  )  OUTSIDE IMMUNIZATIONS                 (  )  _______________         Please fax records to [facility], Bourgeois, Leonard Jb. Sr., MD, [phone]     If you have any questions, please contact our office at [phone].           Patient Name: Rico Nagel  : 1940  Patient Phone #: [phone]

## 2023-12-19 NOTE — ASSESSMENT & PLAN NOTE
Lab Results   Component Value Date    HGBA1C 6.1 12/11/2023   Patient's diabetes is well controlled and at goal without medication.

## 2023-12-19 NOTE — PROGRESS NOTES
Patient Name: Rico Nagel     Patient ID: [id number]     Chief Complaint: Results (Go over lab results.) and Balance Issues      HPI:     Rico Nagel is a 83 y.o. male here today for follow up for Diabetes, Anxiety, RLS, ED, and lab work results. Reviewed and discussed lab work results.  Patient underwent an amulet procedure in March of 2023 for atrial fibrillation.  As a result he was no longer taking anticoagulants.    Past Medical History:   Diagnosis Date    Anxiety disorder, unspecified     Arthritis     Atrial fibrillation     Carotid artery stenosis     Coronary artery disease     Diabetes mellitus     Eczema     GI bleeding     History of kidney stones     Hypertension     Insomnia     Iron deficiency anemia, unspecified     PAD (peripheral artery disease)     Restless leg syndrome     Sleep apnea     does not use CPAP since recall    Unspecified glaucoma     Vitamin D deficiency         Past Surgical History:   Procedure Laterality Date    CARDIOVERSION  08/08/2022    CLOSURE OF LEFT ATRIAL APPENDAGE USING DEVICE N/A 03/28/2023    Procedure: Left atrial appendage closure device;  Surgeon: Ayo Colon MD;  Location: [facility] CATH LAB;  Service: Cardiology;  Laterality: N/A;  AMULET W/ INTRA OP DELORIS    COLONOSCOPY      CORONARY ANGIOPLASTY WITH STENT PLACEMENT N/A 01/2015    LO to LAD & BMS to RPLA    CYSTOSCOPY W/ STONE MANIPULATION N/A     ECHOCARDIOGRAM, TRANSESOPHAGEAL  05/04/2023    Dr. Colon    ECHOCARDIOGRAM,TRANSESOPHAGEAL N/A 03/28/2023    Procedure: Transesophageal echo (DELORIS) intra-procedure log documentation;  Surgeon: Pee Diagnostic Provider;  Location: [facility] CATH LAB;  Service: Cardiology;  Laterality: N/A;    ESOPHAGOGASTRODUODENOSCOPY  08/10/2022    EXTERNAL EAR SURGERY Left 11/2023    EYE SURGERY Bilateral     Cataract & Glaucoma    KIDNEY STONE SURGERY      KNEE ARTHROPLASTY Left     LEFT HEART CATHETERIZATION Left 06/26/2023    Procedure: Left heart cath;  Surgeon: Laura Johnson MD;   Location: [facility] CATH LAB;  Service: Cardiology;  Laterality: Left;  LHC VIA RT GROIN    PERCUTANEOUS NEPHROLITHOTOMY          Social History     Socioeconomic History    Marital status:     Number of children: 1   Tobacco Use    Smoking status: Some Days     Types: Cigars    Smokeless tobacco: Never   Substance and Sexual Activity    Alcohol use: Yes     Comment: 3x week    Drug use: Never    Sexual activity: Not Currently     Social Determinants of Health     Financial Resource Strain: Low Risk  (6/19/2023)    Overall Financial Resource Strain (CARDIA)     Difficulty of Paying Living Expenses: Not very hard   Food Insecurity: No Food Insecurity (6/19/2023)    Hunger Vital Sign     Worried About Running Out of Food in the Last Year: Never true     Ran Out of Food in the Last Year: Never true   Transportation Needs: No Transportation Needs (6/19/2023)    PRAPARE - Transportation     Lack of Transportation (Medical): No     Lack of Transportation (Non-Medical): No   Physical Activity: Insufficiently Active (6/19/2023)    Exercise Vital Sign     Days of Exercise per Week: 1 day     Minutes of Exercise per Session: 10 min   Stress: No Stress Concern Present (6/19/2023)    [employer] - Occupational Stress Questionnaire     Feeling of Stress : Not at all   Social Connections: Moderately Integrated (6/19/2023)    Social Connection and Isolation Panel [NHANES]     Frequency of Communication with Friends and Family: More than three times a week     Frequency of Social Gatherings with Friends and Family: More than three times a week     Attends S[employer] Services: 1 to 4 times per year     Active Member of Clubs or Organizations: No     Attends Club or Organization Meetings: 1 to 4 times per year     Marital Status:    Housing Stability: Low Risk  (6/19/2023)    Housing Stability Vital Sign     Unable to Pay for Housing in the Last Year: No     Number of Places Lived in the Last  Year: 1     Unstable Housing in the Last Year: No        Current Outpatient Medications   Medication Instructions    ALPRAZolam (XANAX) 0.5 mg, Oral, Nightly PRN    aspirin (ECOTRIN) 81 mg, Oral, Daily    atorvastatin (LIPITOR) 20 mg, Oral, Nightly    cholecalciferol (vitamin D3) 5,000 Units, Oral, Daily    CONTOUR NEXT TEST STRIPS Strp USE TO TEST BLOOD GLUCOSE ONCE A DAY    cyanocobalamin, vitamin B-12, (VITAMIN B-12 ORAL) 1 tablet, Oral, Nightly, 1000mcg    diltiaZEM (TIAZAC) 180 mg, Oral, Daily    ENTRESTO 24-26 mg per tablet 1 tablet, Oral, 2 times daily    ferrous sulfate (FEOSOL) Tab tablet 1 tablet, Oral, With breakfast    furosemide (LASIX) 20 mg, Oral, 2 times daily    metoprolol succinate (TOPROL-XL) 25 mg, Oral, 2 times daily    mv-mn/om3/dha/epa/fish/lut/nasima (OCUVITE ADULT 50 PLUS ORAL) 1 tablet, Oral, Nightly    pantoprazole (PROTONIX) 40 mg, Oral, Daily    pramipexole (MIRAPEX) 0.5-1 mg, Oral, Nightly    tadalafiL (CIALIS) 10 mg, Oral, Daily PRN    ZIOPTAN, PF, 0.0015 % Dpet 1 drop, Right Eye, Nightly       Review of patient's allergies indicates:   Allergen Reactions    Aspirin Other (See Comments)     Can't take 325 mg d/t GI Bleed, can take 81 mg    Eliquis [apixaban] Other (See Comments)     GI Bleed; severe bleeding from nose & mouth        Immunization History   Administered Date(s) Administered    COVID-19, MRNA, LN-S, PF (Pfizer) (Purple Cap) 01/27/2021, 02/24/2021       Patient Care Team:  Leon Vaughan Sr., MD as PCP - General (Family Medicine)  Laura Johnson MD as Consulting Physician (Cardiology)  Ayo Colon MD as Consulting Physician (Cardiology)  Breille Phelps MD as Consulting Physician (Ophthalmology)     Subjective:     Review of Systems    10 point review of systems conducted, negative except as stated in the history of present illness. See HPI for details.    Objective:     Visit Vitals  BP (!) 140/85 (BP Location: Right arm, Patient Position: Sitting, BP Method:  "Medium (Manual))   Pulse 92   Temp 97.4 °F (36.3 °C)   Resp 18   Ht 5' 11" (1.803 m)   Wt 84.4 kg (186 lb)   SpO2 98%   BMI 25.94 kg/m²       Physical Exam  Constitutional:       Appearance: Normal appearance.   HENT:      Head: Normocephalic and atraumatic.   Cardiovascular:      Rate and Rhythm: Normal rate. Rhythm irregular.   Pulmonary:      Effort: Pulmonary effort is normal.      Breath sounds: Normal breath sounds.   Abdominal:      Palpations: Abdomen is soft.      Tenderness: There is no abdominal tenderness.   Musculoskeletal:         General: No swelling or tenderness. Normal range of motion.      Cervical back: Normal range of motion and neck supple.      Right lower leg: No edema.      Left lower leg: No edema.   Lymphadenopathy:      Cervical: No cervical adenopathy.   Skin:     General: Skin is warm and dry.   Neurological:      General: No focal deficit present.      Mental Status: He is alert and oriented to person, place, and time.   Psychiatric:         Mood and Affect: Mood normal.         Assessment:       ICD-10-CM ICD-9-CM   1. Type 2 diabetes mellitus without complication, without long-term current use of insulin  E11.9 250.00   2. PAF (paroxysmal atrial fibrillation)  I48.0 427.31   3. Anxiety  F41.9 300.00   4. RLS (restless legs syndrome)  G25.81 333.94   5. Erectile dysfunction, unspecified erectile dysfunction type  N52.9 607.84       Plan:   1. Type 2 diabetes mellitus without complication, without long-term current use of insulin  Overview:  Patient is diet controlled.  Follow ADA Diet. Avoid soda, simple sweets, and limit rice/pasta/breads/starches (no more than 45-50 grams per meal).  Maintain healthy weight with goal BMI <30.  Exercise 5 times per week for 30 minutes per day.  Stressed importance of daily foot exams.  Stressed importance of annual dilated eye exam.    Assessment & Plan:  Lab Results   Component Value Date    HGBA1C 6.1 12/11/2023   Patient's diabetes is well " controlled and at goal without medication.    Orders:  -     Hemoglobin A1C, POCT; Future; Expected date: 03/19/2024    2. PAF (paroxysmal atrial fibrillation)  Overview:  Patient recently underwent an Amulet procedure to obviate the need for anticoagulation.    Followed by Cardiology.      3. Anxiety  Overview:  Continue with Xanax 0.5 mg nightly PRN anxiety.  Practice deep breathing or abdominal breathing exercises when anxiety occurs.  Exercise daily. Get sunlight daily.  Avoid caffeine, alcohol and stimulants.  Practice positive phrases and repeat throughout the day, along with yoga and relaxation techniques.  Set healthy boundaries, avoid people and conversations that increase stress.  Educated patient on the risks of serotonin based medications such as serotonin modulators and SSRIs/SNRIs including common side effects of nausea, GI upset, headache dizziness as well as the rare risk for worsening symptoms of depression including development of suicidal thoughts or ideations, and serotonin syndrome.   Discussed benefits of medication not becoming noticeable until up to 6 weeks from start date.   Reports any symptoms of suicidal or homicidal ideations immediately, if clinic is closed go to nearest emergency room.  Discussed possibility of using benzodiazepines    Assessment & Plan:  Patient is well controlled.    Orders:  -     ALPRAZolam (XANAX) 0.5 MG tablet; Take 1 tablet (0.5 mg total) by mouth nightly as needed for Anxiety.  Dispense: 30 tablet; Refill: 2    4. RLS (restless legs syndrome)  Overview:  Continue with Mirapex 0.5 mg 1-2 tabs daily.    Assessment & Plan:  Patient is well controlled.    Orders:  -     pramipexole (MIRAPEX) 0.5 MG tablet; Take 1-2 tablets (0.5-1 mg total) by mouth every evening.  Dispense: 180 tablet; Refill: 0    5. Erectile dysfunction, unspecified erectile dysfunction type  Overview:  Continue with Cialis 10 mg daily PRN Erectile Dysfunction, no more than 2 a  week.    Assessment & Plan:  Patient is well controlled.    Orders:  -     tadalafiL (CIALIS) 10 MG tablet; Take 1 tablet (10 mg total) by mouth daily as needed for Erectile Dysfunction.  Dispense: 5 tablet; Refill: 2        [x] Discussed lab findings with the patient.  [x] Discussed diet, exercise and if appropriate, weight loss.  [x] Instructions and information, including risks and benefits of prescribed medication(s) have been reviewed with the patient and patient verbalizes understanding. Questions have been answered to the patient's satisfaction.  [] Appropriate counseling has been given regarding anxiety and depressive issues that were discussed today.  [] Any lab drawn today will be reviewed by physician at the time it is received and appropriate recommendations bill be made and discussed with patient.     Follow up in about 3 months (around 3/19/2024) for Diabetes Follow up with HbA1c.   In addition to their scheduled follow up, the patient has also been instructed to follow up on as needed basis.     Future Appointments   Date Time Provider Department Center   3/19/2024  1:30 PM Leon Vaughan Sr., MD [facility] Vaughan Sr, MD

## 2024-01-02 ENCOUNTER — DOCUMENTATION ONLY (OUTPATIENT)
Dept: FAMILY MEDICINE | Facility: CLINIC | Age: 84
End: 2024-01-02
Payer: MEDICARE

## 2024-01-16 ENCOUNTER — TELEPHONE (OUTPATIENT)
Dept: FAMILY MEDICINE | Facility: CLINIC | Age: 84
End: 2024-01-16
Payer: MEDICARE

## 2024-01-16 NOTE — TELEPHONE ENCOUNTER
----- Message from Lois Hamlin sent at 1/16/2024 10:54 AM CST -----  MS. BOCANEGRA LEFT A MESSAGE FOR MR. CAMP FOR ALPRAZOLAM TOO

## 2024-01-16 NOTE — TELEPHONE ENCOUNTER
Mrs Gan notified that Mr Gómez and Her Rx's are at [facility] . To check with her pharmacy. Patient verbalized an understanding.

## 2024-03-19 ENCOUNTER — DOCUMENTATION ONLY (OUTPATIENT)
Dept: FAMILY MEDICINE | Facility: CLINIC | Age: 84
End: 2024-03-19

## 2024-03-19 ENCOUNTER — OFFICE VISIT (OUTPATIENT)
Dept: FAMILY MEDICINE | Facility: CLINIC | Age: 84
End: 2024-03-19
Payer: MEDICARE

## 2024-03-19 VITALS
SYSTOLIC BLOOD PRESSURE: 138 MMHG | BODY MASS INDEX: 26.51 KG/M2 | OXYGEN SATURATION: 96 % | RESPIRATION RATE: 20 BRPM | TEMPERATURE: 97 F | DIASTOLIC BLOOD PRESSURE: 88 MMHG | HEIGHT: 71 IN | WEIGHT: 189.38 LBS | HEART RATE: 66 BPM

## 2024-03-19 DIAGNOSIS — I48.11 LONGSTANDING PERSISTENT ATRIAL FIBRILLATION: ICD-10-CM

## 2024-03-19 DIAGNOSIS — I10 ESSENTIAL HYPERTENSION, BENIGN: ICD-10-CM

## 2024-03-19 DIAGNOSIS — R80.9 MICROALBUMINURIA: ICD-10-CM

## 2024-03-19 DIAGNOSIS — E11.9 TYPE 2 DIABETES MELLITUS WITHOUT COMPLICATION, WITHOUT LONG-TERM CURRENT USE OF INSULIN: Primary | ICD-10-CM

## 2024-03-19 LAB
BILIRUB SERPL-MCNC: NORMAL MG/DL
BLOOD URINE, POC: NORMAL
CLARITY, POC UA: CLEAR
COLOR, POC UA: YELLOW
GLUCOSE UR QL STRIP: NORMAL
HBA1C MFR BLD: 7 %
KETONES UR QL STRIP: NORMAL
LEUKOCYTE ESTERASE URINE, POC: NORMAL
NITRITE, POC UA: NORMAL
PH, POC UA: 6
POC CREATININE: 50 MG/DL (ref 0.6–1.3)
POC MICROALBUMIN URINE: 150
POC MICROALBUMIN/CREATININE RATIO: >300 ΜG/MG (ref 0–30)
PROTEIN, POC: NORMAL
SPECIFIC GRAVITY, POC UA: 1.01
UROBILINOGEN, POC UA: NORMAL

## 2024-03-19 PROCEDURE — 82044 UR ALBUMIN SEMIQUANTITATIVE: CPT | Mod: QW,,, | Performed by: FAMILY MEDICINE

## 2024-03-19 PROCEDURE — 81002 URINALYSIS NONAUTO W/O SCOPE: CPT | Mod: ,,, | Performed by: FAMILY MEDICINE

## 2024-03-19 PROCEDURE — 83036 HEMOGLOBIN GLYCOSYLATED A1C: CPT | Mod: QW,,, | Performed by: FAMILY MEDICINE

## 2024-03-19 PROCEDURE — 99214 OFFICE O/P EST MOD 30 MIN: CPT | Mod: ,,, | Performed by: FAMILY MEDICINE

## 2024-03-19 RX ORDER — ASPIRIN 325 MG
325 TABLET, DELAYED RELEASE (ENTERIC COATED) ORAL DAILY
COMMUNITY

## 2024-03-19 NOTE — ASSESSMENT & PLAN NOTE
We performed a point of care A1c here in the office today and it was 7.0.  We had a fairly extensive discussion regarding dietary choices.  I told him I would like his A1c to be in the sixes.  He voiced a clear understanding in this matter.

## 2024-03-19 NOTE — PROGRESS NOTES
Patient Name: Rico Nagel     Patient ID: [id number]     Chief Complaint: Diabetes (Patient here today for A1C.   A1C = 7.0%)      HPI:     Rico Nagel is a 83 y.o. male here today for follow-up of diabetes and hypertension.     Past Medical History:   Diagnosis Date    Anxiety disorder, unspecified     Arthritis     Atrial fibrillation     Carotid artery stenosis     Coronary artery disease     Diabetes mellitus     Eczema     GI bleeding     History of kidney stones     Hypertension     Insomnia     Iron deficiency anemia, unspecified     PAD (peripheral artery disease)     Restless leg syndrome     Sleep apnea     does not use CPAP since recall    Unspecified glaucoma     Vitamin D deficiency         Past Surgical History:   Procedure Laterality Date    CARDIOVERSION  08/08/2022    CLOSURE OF LEFT ATRIAL APPENDAGE USING DEVICE N/A 03/28/2023    Procedure: Left atrial appendage closure device;  Surgeon: Ayo Colon MD;  Location: [facility] CATH LAB;  Service: Cardiology;  Laterality: N/A;  AMULET W/ INTRA OP DELORIS    COLONOSCOPY      CORONARY ANGIOPLASTY WITH STENT PLACEMENT N/A 01/2015    LO to LAD & BMS to RPLA    CYSTOSCOPY W/ STONE MANIPULATION N/A     ECHOCARDIOGRAM, TRANSESOPHAGEAL  05/04/2023    Dr. Colon    ECHOCARDIOGRAM,TRANSESOPHAGEAL N/A 03/28/2023    Procedure: Transesophageal echo (DELORIS) intra-procedure log documentation;  Surgeon: Pee Diagnostic Provider;  Location: [facility] CATH LAB;  Service: Cardiology;  Laterality: N/A;    ESOPHAGOGASTRODUODENOSCOPY  08/10/2022    EXTERNAL EAR SURGERY Left 11/2023    EYE SURGERY Bilateral     Cataract & Glaucoma    KIDNEY STONE SURGERY      KNEE ARTHROPLASTY Left     LEFT HEART CATHETERIZATION Left 06/26/2023    Procedure: Left heart cath;  Surgeon: Laura Johnson MD;  Location: [facility] CATH LAB;  Service: Cardiology;  Laterality: Left;  LHC VIA RT GROIN    PERCUTANEOUS NEPHROLITHOTOMY          Social History     Socioeconomic History    Marital status:      Number of children: 1   Tobacco Use    Smoking status: Some Days     Types: Cigars    Smokeless tobacco: Never   Substance and Sexual Activity    Alcohol use: Yes     Alcohol/week: 4.0 standard drinks of alcohol     Types: 4 Shots of liquor per week     Comment: 3x week    Drug use: Never    Sexual activity: Not Currently     Social Determinants of Health     Financial Resource Strain: Low Risk  (6/19/2023)    Overall Financial Resource Strain (CARDIA)     Difficulty of Paying Living Expenses: Not very hard   Food Insecurity: No Food Insecurity (6/19/2023)    Hunger Vital Sign     Worried About Running Out of Food in the Last Year: Never true     Ran Out of Food in the Last Year: Never true   Transportation Needs: No Transportation Needs (6/19/2023)    PRAPARE - Transportation     Lack of Transportation (Medical): No     Lack of Transportation (Non-Medical): No   Physical Activity: Insufficiently Active (6/19/2023)    Exercise Vital Sign     Days of Exercise per Week: 1 day     Minutes of Exercise per Session: 10 min   Stress: No Stress Concern Present (6/19/2023)    [employer] - Occupational Stress Questionnaire     Feeling of Stress : Not at all   Social Connections: Moderately Integrated (6/19/2023)    Social Connection and Isolation Panel [NHANES]     Frequency of Communication with Friends and Family: More than three times a week     Frequency of Social Gatherings with Friends and Family: More than three times a week     Attends [employer] Services: 1 to 4 times per year     Active Member of Clubs or Organizations: No     Attends Club or Organization Meetings: 1 to 4 times per year     Marital Status:    Housing Stability: Low Risk  (6/19/2023)    Housing Stability Vital Sign     Unable to Pay for Housing in the Last Year: No     Number of Places Lived in the Last Year: 1     Unstable Housing in the Last Year: No        Current Outpatient Medications   Medication Instructions     ALPRAZolam (XANAX) 0.5 mg, Oral, Nightly PRN    aspirin (ECOTRIN) 81 mg, Oral, Daily    aspirin (ECOTRIN) 325 mg, Oral, Daily    atorvastatin (LIPITOR) 20 mg, Oral, Nightly    cholecalciferol (vitamin D3) 5,000 Units, Oral, Daily    CONTOUR NEXT TEST STRIPS Strp USE TO TEST BLOOD GLUCOSE ONCE A DAY    cyanocobalamin, vitamin B-12, (VITAMIN B-12 ORAL) 1 tablet, Oral, Nightly, 1000mcg    diltiaZEM (TIAZAC) 180 mg, Oral, Daily    ENTRESTO 24-26 mg per tablet 1 tablet, Oral, 2 times daily    ferrous sulfate (FEOSOL) Tab tablet 1 tablet, Oral, With breakfast    furosemide (LASIX) 20 mg, Oral, 2 times daily    metoprolol succinate (TOPROL-XL) 25 mg, Oral, 2 times daily    mv-mn/om3/dha/epa/fish/lut/nasima (OCUVITE ADULT 50 PLUS ORAL) 1 tablet, Oral, Nightly    pantoprazole (PROTONIX) 40 mg, Oral, Daily    pramipexole (MIRAPEX) 0.5-1 mg, Oral, Nightly    tadalafiL (CIALIS) 10 mg, Oral, Daily PRN    ZIOPTAN, PF, 0.0015 % Dpet 1 drop, Right Eye, Nightly       Review of patient's allergies indicates:   Allergen Reactions    Aspirin Other (See Comments)     Can't take 325 mg d/t GI Bleed, can take 81 mg    Eliquis [apixaban] Other (See Comments)     GI Bleed; severe bleeding from nose & mouth        Immunization History   Administered Date(s) Administered    COVID-19, MRNA, LN-S, PF (Pfizer) (Purple Cap) 01/27/2021, 02/24/2021       Patient Care Team:  Leon Vaughan Sr., MD as PCP - General (Family Medicine)  Laura Johnson MD as Consulting Physician (Cardiology)  Ayo Colon MD as Consulting Physician (Cardiology)  Brielle Phelps MD as Consulting Physician (Ophthalmology)  James Landin MD (Otolaryngology)     Subjective:     Review of Systems    10 point review of systems conducted, negative except as stated in the history of present illness. See HPI for details.    Objective:     Visit Vitals  /88 (BP Location: Left arm, Patient Position: Sitting, BP Method: Medium (Manual))   Pulse 66   Temp 97.1  "°F (36.2 °C)   Resp 20   Ht 5' 11" (1.803 m)   Wt 85.9 kg (189 lb 6.4 oz)   SpO2 96%   BMI 26.42 kg/m²       Physical Exam  Constitutional:       Appearance: Normal appearance.   HENT:      Head: Normocephalic and atraumatic.   Cardiovascular:      Rate and Rhythm: Normal rate. Rhythm irregular.   Pulmonary:      Effort: Pulmonary effort is normal.      Breath sounds: Normal breath sounds.   Abdominal:      Palpations: Abdomen is soft.      Tenderness: There is no abdominal tenderness.   Musculoskeletal:         General: No swelling or tenderness. Normal range of motion.      Cervical back: Normal range of motion and neck supple.      Right lower leg: No edema.      Left lower leg: No edema.   Lymphadenopathy:      Cervical: No cervical adenopathy.   Skin:     General: Skin is warm and dry.   Neurological:      General: No focal deficit present.      Mental Status: He is alert and oriented to person, place, and time.   Psychiatric:         Mood and Affect: Mood normal.         Assessment:       ICD-10-CM ICD-9-CM   1. Type 2 diabetes mellitus without complication, without long-term current use of insulin  E11.9 250.00   2. Essential hypertension, benign  I10 401.1   3. Longstanding persistent atrial fibrillation  I48.11 427.31   4. Microalbuminuria  R80.9 791.0       Plan:   1. Type 2 diabetes mellitus without complication, without long-term current use of insulin  Overview:  Patient is diet controlled.  Follow ADA Diet. Avoid soda, simple sweets, and limit rice/pasta/breads/starches (no more than 45-50 grams per meal).  Maintain healthy weight with goal BMI <30.  Exercise 5 times per week for 30 minutes per day.  Stressed importance of daily foot exams.  Stressed importance of annual dilated eye exam.    Assessment & Plan:  We performed a point of care A1c here in the office today and it was 7.0.  We had a fairly extensive discussion regarding dietary choices.  I told him I would like his A1c to be in the sixes.  " He voiced a clear understanding in this matter.    Orders:  -     Hemoglobin A1C, POCT  -     POCT Microalbumin/Creatinine Ratio  -     POCT urine dipstick without microscope    2. Essential hypertension, benign  Overview:  Hypertension Medications               ENTRESTO 24-26 mg per tablet Take 1 tablet by mouth 2 (two) times daily.    metoprolol succinate (TOPROL-XL) 25 MG 24 hr tablet Take 25 mg by mouth 2 (two) times daily.    diltiaZEM (TIAZAC) 180 MG Cs24 Take 180 mg by mouth Daily.    furosemide (LASIX) 20 MG tablet Take 20 mg by mouth 2 (two) times a day.        Followed by Cardiology.  Low Sodium Diet (DASH Diet - Less than 2 grams of sodium per day).  Monitor blood pressure daily and log. Report consistent numbers greater than 140/90.  Maintain healthy weight with goal BMI <30. Exercise 30 minutes per day, 5 days per week.    Assessment & Plan:  Patient's hypertension is well controlled and at goal.      3. Longstanding persistent atrial fibrillation  Overview:  Patient is not anticoagulated having undergone an Amulet procedure last year. He was instructed to report to the nearest emergency room for any chest pain ,shortness of breath or elevated heart rate i.e. greater than 100.  It was recommended that he stay away from all stimulant medication including over-the-counter cough and cold preparations that contain Sudafed. He was also warned against excessive caffeine intake. Patient is presently being followed by Cardiology.     Assessment & Plan:  Patient's AFib is rate controlled.      4. Microalbuminuria  Overview:  Because of patient's history of diabetes we performed a point of care urinalysis and a albumin creatinine ratio here in the office.  There was 1+ protein noted on his urinalysis and his albumin creatinine ratio was greater than 300 indicating microalbuminuria.  Patient is on Entresto for heart failure and also provides renal protection.          [x] Discussed lab findings with the  patient.  [x] Discussed diet, exercise and if appropriate, weight loss.  [] Instructions and information, including risks and benefits of prescribed medication(s) have been reviewed with the patient and patient verbalizes understanding. Questions have been answered to the patient's satisfaction.  [] Appropriate counseling has been given regarding anxiety and depressive issues that were discussed today.  [] Any lab drawn today will be reviewed by physician at the time it is received and appropriate recommendations bill be made and discussed with patient.     Follow up in about 2 months (around 5/19/2024).   In addition to their scheduled follow up, the patient has also been instructed to follow up on as needed basis.     Future Appointments   Date Time Provider Department Center   5/24/2024  8:00 AM Leon Vaughan Sr., MD [facility] Vaughan Sr, MD

## 2024-06-03 ENCOUNTER — LAB VISIT (OUTPATIENT)
Dept: FAMILY MEDICINE | Facility: CLINIC | Age: 84
End: 2024-06-03
Payer: MEDICARE

## 2024-06-03 DIAGNOSIS — E11.9 TYPE 2 DIABETES MELLITUS WITHOUT COMPLICATION, WITHOUT LONG-TERM CURRENT USE OF INSULIN: Primary | ICD-10-CM

## 2024-06-03 LAB
EST. AVERAGE GLUCOSE BLD GHB EST-MCNC: 134.1 MG/DL
HBA1C MFR BLD: 6.3 %
HOLD SPECIMEN: NORMAL

## 2024-06-03 PROCEDURE — 83036 HEMOGLOBIN GLYCOSYLATED A1C: CPT | Performed by: FAMILY MEDICINE

## 2024-06-04 NOTE — PROGRESS NOTES
Family Medicine    Patient ID: [id number]     Chief Complaint: Results (No concerns)    HPI:     Rico Nagel is a 83 y.o. male here today for a follow up to review hemoglobin A1c lab.    Past Medical History:   Diagnosis Date    Anxiety disorder, unspecified     Arthritis     Atrial fibrillation     Carotid artery stenosis     Coronary artery disease     Diabetes mellitus     Eczema     GI bleeding     History of kidney stones     Hypertension     Insomnia     Iron deficiency anemia, unspecified     PAD (peripheral artery disease)     Restless leg syndrome     Sleep apnea     does not use CPAP since recall    Unspecified glaucoma     Vitamin D deficiency         Past Surgical History:   Procedure Laterality Date    CARDIOVERSION  08/08/2022    CLOSURE OF LEFT ATRIAL APPENDAGE USING DEVICE N/A 03/28/2023    Procedure: Left atrial appendage closure device;  Surgeon: Ayo Colon MD;  Location: [facility] CATH LAB;  Service: Cardiology;  Laterality: N/A;  AMULET W/ INTRA OP DELORIS    COLONOSCOPY      CORONARY ANGIOPLASTY WITH STENT PLACEMENT N/A 01/2015    LO to LAD & BMS to RPLA    CYSTOSCOPY W/ STONE MANIPULATION N/A     ECHOCARDIOGRAM, TRANSESOPHAGEAL  05/04/2023    Dr. Colon    ECHOCARDIOGRAM,TRANSESOPHAGEAL N/A 03/28/2023    Procedure: Transesophageal echo (DELORIS) intra-procedure log documentation;  Surgeon: [facility] Diagnostic Provider;  Location: [facility] CATH LAB;  Service: Cardiology;  Laterality: N/A;    ESOPHAGOGASTRODUODENOSCOPY  08/10/2022    EXTERNAL EAR SURGERY Left 11/2023    EYE SURGERY Bilateral     Cataract & Glaucoma    KIDNEY STONE SURGERY      KNEE ARTHROPLASTY Left     LEFT HEART CATHETERIZATION Left 06/26/2023    Procedure: Left heart cath;  Surgeon: Laura Johnson MD;  Location: [facility] CATH LAB;  Service: Cardiology;  Laterality: Left;  LHC VIA RT GROIN    PERCUTANEOUS NEPHROLITHOTOMY          Social History     Tobacco Use    Smoking status: Some Days     Types: Cigars    Smokeless tobacco: Never   Substance  and Sexual Activity    Alcohol use: Yes     Alcohol/week: 4.0 standard drinks of alcohol     Types: 4 Shots of liquor per week     Comment: 3x week    Drug use: Never    Sexual activity: Not Currently        Current Outpatient Medications   Medication Instructions    ALPRAZolam (XANAX) 0.5 mg, Oral, Nightly PRN    aspirin (ECOTRIN) 325 mg, Oral, Daily    atorvastatin (LIPITOR) 20 mg, Oral, Nightly    cholecalciferol (vitamin D3) 5,000 Units, Daily    CONTOUR NEXT TEST STRIPS Strp USE TO TEST BLOOD GLUCOSE ONCE A DAY    cyanocobalamin, vitamin B-12, (VITAMIN B-12 ORAL) 1 tablet, Nightly    diltiaZEM (TIAZAC) 180 mg, Oral, Daily    ENTRESTO 24-26 mg per tablet 1 tablet, Oral, 2 times daily    ferrous sulfate (FEOSOL) Tab tablet 1 tablet, Oral, With breakfast    furosemide (LASIX) 20 mg, 2 times daily    metoprolol succinate (TOPROL-XL) 25 mg, Oral, 2 times daily    mv-mn/om3/dha/epa/fish/lut/nasima (OCUVITE ADULT 50 PLUS ORAL) 1 tablet, Nightly    pantoprazole (PROTONIX) 40 mg, Oral, Daily    pramipexole (MIRAPEX) 0.5-1 mg, Oral, Nightly    tadalafiL (CIALIS) 10 mg, Oral, Daily PRN    ZIOPTAN, PF, 0.0015 % Dpet 1 drop, Right Eye, Nightly       Review of patient's allergies indicates:   Allergen Reactions    Aspirin Other (See Comments)     Can't take 325 mg d/t GI Bleed, can take 81 mg    Eliquis [apixaban] Other (See Comments)     GI Bleed; severe bleeding from nose & mouth        Patient Care Team:  Leon Vaughan Sr., MD as PCP - General (Family Medicine)  Laura Johnson MD as Consulting Physician (Cardiology)  Ayo Colon MD as Consulting Physician (Cardiology)  Brielle Pehlps MD as Consulting Physician (Ophthalmology)  James Landin MD (Otolaryngology)     Subjective:     Review of Systems   Constitutional:  Negative for activity change, appetite change, fever and unexpected weight change.   HENT:  Negative for congestion, dental problem, rhinorrhea and trouble swallowing.    Eyes:  Negative for  "visual disturbance.   Respiratory:  Negative for chest tightness and shortness of breath.    Cardiovascular:  Negative for chest pain, palpitations and leg swelling.   Gastrointestinal:  Negative for abdominal pain, blood in stool, constipation, diarrhea, nausea and vomiting.   Genitourinary:  Negative for difficulty urinating.   Musculoskeletal:  Negative for gait problem.   Skin:  Negative for rash and wound.   Neurological:  Negative for dizziness, syncope, speech difficulty, weakness and light-headedness.   Psychiatric/Behavioral:  Negative for confusion and suicidal ideas.        12 point review of systems conducted, negative except as stated in the history of present illness. See HPI for details.    Objective:     Visit Vitals  /64   Pulse 84   Temp 97.4 °F (36.3 °C)   Resp 18   Ht 5' 11" (1.803 m)   Wt 85.9 kg (189 lb 6 oz)   SpO2 95%   BMI 26.41 kg/m²       Physical Exam  Vitals and nursing note reviewed.   Constitutional:       General: He is not in acute distress.     Appearance: Normal appearance. He is not ill-appearing, toxic-appearing or diaphoretic.   HENT:      Head: Normocephalic and atraumatic.      Right Ear: Tympanic membrane, ear canal and external ear normal. There is no impacted cerumen.      Left Ear: Tympanic membrane, ear canal and external ear normal. There is no impacted cerumen.      Nose: No congestion or rhinorrhea.      Mouth/Throat:      Pharynx: Oropharynx is clear. No oropharyngeal exudate or posterior oropharyngeal erythema.   Eyes:      General:         Right eye: No discharge.         Left eye: No discharge.      Extraocular Movements: Extraocular movements intact.      Conjunctiva/sclera: Conjunctivae normal.   Cardiovascular:      Rate and Rhythm: Normal rate and regular rhythm.      Heart sounds: No murmur heard.     No friction rub. No gallop.   Pulmonary:      Effort: Pulmonary effort is normal. No respiratory distress.      Breath sounds: Normal breath sounds. "   Musculoskeletal:      Right lower leg: No edema.      Left lower leg: No edema.   Skin:     Capillary Refill: Capillary refill takes less than 2 seconds.   Neurological:      Mental Status: He is alert and oriented to person, place, and time.   Psychiatric:         Mood and Affect: Mood normal.         Behavior: Behavior normal.         Thought Content: Thought content normal.         Labs Reviewed:     Chemistry:  Lab Results   Component Value Date     12/11/2023    K 4.0 12/11/2023    BUN 18.7 12/11/2023    CREATININE 0.85 12/11/2023    EGFRNORACEVR >60 12/11/2023    GLUCOSE 125 (H) 12/11/2023    CALCIUM 9.2 12/11/2023    ALKPHOS 93 12/11/2023    LABPROT 6.8 12/11/2023    ALBUMIN 3.7 12/11/2023    BILIDIR 0.5 (H) 06/13/2023    IBILI 1.00 (H) 06/13/2023    AST 21 12/11/2023    ALT 21 12/11/2023    MG 2.10 04/05/2023    QUUBFLTB45UB 53.6 06/13/2023    TSH 1.786 12/11/2023        Lab Results   Component Value Date    HGBA1C 6.3 06/03/2024        Hematology:  Lab Results   Component Value Date    WBC 7.26 12/11/2023    HGB 16.4 12/11/2023    HCT 50.5 12/11/2023     12/11/2023       Lipid Panel:  Lab Results   Component Value Date    CHOL 130 12/11/2023    HDL 55 12/11/2023    LDL 56.00 12/11/2023    TRIG 95 12/11/2023    TOTALCHOLEST 2 12/11/2023        Urine:  Lab Results   Component Value Date    APPEARANCEUA Clear 03/27/2023    SGUA >=1.040 (H) 03/27/2023    PROTEINUA 1+ (A) 03/27/2023    KETONESUA Negative 03/27/2023    LEUKOCYTESUR Negative 03/27/2023    RBCUA <5 03/27/2023    WBCUA <5 03/27/2023    BACTERIA None Seen 03/27/2023        Assessment:       ICD-10-CM ICD-9-CM   1. Type 2 diabetes mellitus without complication, without long-term current use of insulin  E11.9 250.00   2. Visit for annual health examination  Z00.00 V70.0        Plan:     1. Type 2 diabetes mellitus without complication, without long-term current use of insulin  Overview:  Diet-controlled  Current A1c: 6.3, up from 6.1  three months ago.    Continue diet-controlled.  Repeat A1c 6 months  Follow ADA Diet. Avoid soda, simple sweets, and limit rice/pasta/breads/starches (no more than 45-50 grams per meal).  Maintain healthy weight with goal BMI <30.  Exercise 5 times per week for 30 minutes per day.  Stressed importance of daily foot exams.  Stressed importance of annual dilated eye exam.    Orders:  -     CBC Auto Differential; Future; Expected date: 12/05/2024  -     Comprehensive Metabolic Panel; Future; Expected date: 12/05/2024  -     Lipid Panel; Future; Expected date: 12/05/2024  -     TSH; Future; Expected date: 12/05/2024  -     Hemoglobin A1C; Future; Expected date: 12/05/2024    2. Visit for annual health examination  Comments:  This diagnosis does not apply to this visit. Labs placed under this diagnosis today are for a future annual visit.  Orders:  -     CBC Auto Differential; Future; Expected date: 12/05/2024  -     Comprehensive Metabolic Panel; Future; Expected date: 12/05/2024  -     Lipid Panel; Future; Expected date: 12/05/2024  -     TSH; Future; Expected date: 12/05/2024  -     Hemoglobin A1C; Future; Expected date: 12/05/2024         No follow-ups on file. In addition to their scheduled follow up, the patient has also been instructed to follow up on as needed basis.     Future Appointments   Date Time Provider Department Center   9/5/2024 10:00 AM PROVIDER, [facility] CINTHIA Adam MD

## 2024-06-05 ENCOUNTER — OFFICE VISIT (OUTPATIENT)
Dept: FAMILY MEDICINE | Facility: CLINIC | Age: 84
End: 2024-06-05
Payer: MEDICARE

## 2024-06-05 ENCOUNTER — TELEPHONE (OUTPATIENT)
Dept: FAMILY MEDICINE | Facility: CLINIC | Age: 84
End: 2024-06-05

## 2024-06-05 VITALS
SYSTOLIC BLOOD PRESSURE: 128 MMHG | OXYGEN SATURATION: 95 % | RESPIRATION RATE: 18 BRPM | TEMPERATURE: 97 F | HEIGHT: 71 IN | WEIGHT: 189.38 LBS | HEART RATE: 84 BPM | BODY MASS INDEX: 26.51 KG/M2 | DIASTOLIC BLOOD PRESSURE: 64 MMHG

## 2024-06-05 DIAGNOSIS — E11.9 TYPE 2 DIABETES MELLITUS WITHOUT COMPLICATION, WITHOUT LONG-TERM CURRENT USE OF INSULIN: Primary | ICD-10-CM

## 2024-06-05 DIAGNOSIS — Z00.00 VISIT FOR ANNUAL HEALTH EXAMINATION: ICD-10-CM

## 2024-06-05 PROCEDURE — 99213 OFFICE O/P EST LOW 20 MIN: CPT | Mod: ,,, | Performed by: FAMILY MEDICINE

## 2024-06-05 RX ORDER — BLOOD SUGAR DIAGNOSTIC
STRIP MISCELLANEOUS
Qty: 100 EACH | Refills: 1 | Status: SHIPPED | OUTPATIENT
Start: 2024-06-05

## 2024-06-05 NOTE — TELEPHONE ENCOUNTER
----- Message from Lois Hamlin sent at 6/3/2024  9:44 AM CDT -----  He needs strips for his Contour Next called to [facility]

## 2024-08-13 DIAGNOSIS — G25.81 RLS (RESTLESS LEGS SYNDROME): ICD-10-CM

## 2024-08-13 RX ORDER — PRAMIPEXOLE DIHYDROCHLORIDE 0.5 MG/1
.5-1 TABLET ORAL NIGHTLY
Qty: 180 TABLET | Refills: 0 | Status: SHIPPED | OUTPATIENT
Start: 2024-08-13 | End: 2024-11-11

## 2024-08-29 NOTE — PROGRESS NOTES
Family Medicine      Patient ID: [id number]     Chief Complaint: Medicare Annual Wellness       HPI:     Rico Nagel is a 84 y.o. male here today for a Medicare Annual Wellness visit and comprehensive Health Risk Assessment.       Health Maintenance         Date Due Completion Date    TETANUS VACCINE Never done ---    Shingles Vaccine (1 of 2) Never done ---    RSV Vaccine (Age 60+ and Pregnant patients) (1 - 1-dose 60+ series) Never done ---    Pneumococcal Vaccines (Age 65+) (2 of 2 - PCV) 01/15/2017 1/15/2016    Eye Exam 08/29/2024 8/29/2023    Influenza Vaccine (1) Never done ---    COVID-19 Vaccine (3 - 2023-24 season) 09/01/2024 2/24/2021    Hemoglobin A1c 12/03/2024 6/3/2024    Lipid Panel 12/11/2024 12/11/2023    Diabetes Urine Screening 03/19/2025 3/19/2024    Aspirin/Antiplatelet Therapy 06/05/2025 6/5/2024             Past Medical History:   Diagnosis Date    Anxiety disorder, unspecified     Arthritis     Atrial fibrillation     Carotid artery stenosis     Coronary artery disease     Diabetes mellitus     Eczema     GI bleeding     History of kidney stones     Hypertension     Insomnia     Iron deficiency anemia, unspecified     PAD (peripheral artery disease)     Restless leg syndrome     Sleep apnea     does not use CPAP since recall    Unspecified glaucoma     Vitamin D deficiency         Past Surgical History:   Procedure Laterality Date    CARDIOVERSION  08/08/2022    CLOSURE OF LEFT ATRIAL APPENDAGE USING DEVICE N/A 03/28/2023    Procedure: Left atrial appendage closure device;  Surgeon: Ayo Colon MD;  Location: [facility] CATH LAB;  Service: Cardiology;  Laterality: N/A;  AMULET W/ INTRA OP DELORIS    COLONOSCOPY      CORONARY ANGIOPLASTY WITH STENT PLACEMENT N/A 01/2015    LO to LAD & BMS to RPLA    CYSTOSCOPY W/ STONE MANIPULATION N/A     ECHOCARDIOGRAM, TRANSESOPHAGEAL  05/04/2023    Dr. Colon    ECHOCARDIOGRAM,TRANSESOPHAGEAL N/A 03/28/2023    Procedure: Transesophageal echo (DELORIS) intra-procedure  log documentation;  Surgeon: Pee Diagnostic Provider;  Location: [facility] CATH LAB;  Service: Cardiology;  Laterality: N/A;    ESOPHAGOGASTRODUODENOSCOPY  08/10/2022    EXTERNAL EAR SURGERY Left 11/2023    EYE SURGERY Bilateral     Cataract & Glaucoma    KIDNEY STONE SURGERY      KNEE ARTHROPLASTY Left     LEFT HEART CATHETERIZATION Left 06/26/2023    Procedure: Left heart cath;  Surgeon: Laura Johnson MD;  Location: [facility] CATH LAB;  Service: Cardiology;  Laterality: Left;  LHC VIA RT GROIN    PERCUTANEOUS NEPHROLITHOTOMY          Social History     Socioeconomic History    Marital status:     Number of children: 1   Tobacco Use    Smoking status: Some Days     Types: Cigars    Smokeless tobacco: Never   Substance and Sexual Activity    Alcohol use: Yes     Alcohol/week: 4.0 standard drinks of alcohol     Types: 4 Shots of liquor per week     Comment: 3x week    Drug use: Never    Sexual activity: Not Currently     Social Determinants of Health     Financial Resource Strain: Medium Risk (8/21/2024)    Overall Financial Resource Strain (CARDIA)     Difficulty of Paying Living Expenses: Somewhat hard   Food Insecurity: Patient Declined (8/21/2024)    Hunger Vital Sign     Worried About Running Out of Food in the Last Year: Patient declined     Ran Out of Food in the Last Year: Patient declined   Transportation Needs: No Transportation Needs (6/19/2023)    PRAPARE - Transportation     Lack of Transportation (Medical): No     Lack of Transportation (Non-Medical): No   Physical Activity: Unknown (8/21/2024)    Exercise Vital Sign     Days of Exercise per Week: 5 days   Stress: No Stress Concern Present (8/21/2024)    [employer] - Occupational Stress Questionnaire     Feeling of Stress : Not at all   Housing Stability: Low Risk  (6/19/2023)    Housing Stability Vital Sign     Unable to Pay for Housing in the Last Year: No     Number of Places Lived in the Last Year: 1     Unstable  Housing in the Last Year: No        Family History   Problem Relation Name Age of Onset    Endocrine tumor Mother      Heart disease Father          Current Outpatient Medications   Medication Instructions    ALPRAZolam (XANAX) 0.5 mg, Oral, Nightly PRN    aspirin (ECOTRIN) 325 mg, Oral, Daily    atorvastatin (LIPITOR) 20 mg, Oral, Nightly    cholecalciferol (vitamin D3) 5,000 Units, Daily    CONTOUR NEXT TEST STRIPS Strp USE TO TEST BLOOD GLUCOSE ONCE A DAY    cyanocobalamin, vitamin B-12, (VITAMIN B-12 ORAL) 1 tablet, Nightly    diltiaZEM (TIAZAC) 180 mg, Oral, Daily    ENTRESTO 24-26 mg per tablet 1 tablet, Oral, 2 times daily    ferrous sulfate (FEOSOL) Tab tablet 1 tablet, With breakfast    furosemide (LASIX) 20 mg, 2 times daily    metoprolol succinate (TOPROL-XL) 25 mg, Oral, 2 times daily    mv-mn/om3/dha/epa/fish/lut/nasima (OCUVITE ADULT 50 PLUS ORAL) 1 tablet, Oral, Nightly    pantoprazole (PROTONIX) 40 mg, Oral, Daily    pramipexole (MIRAPEX) 0.5-1 mg, Oral, Nightly    tadalafiL (CIALIS) 10 mg, Oral, Daily PRN    ZIOPTAN, PF, 0.0015 % Dpet 1 drop, Right Eye, Nightly       Review of patient's allergies indicates:   Allergen Reactions    Aspirin Other (See Comments)     Can't take 325 mg d/t GI Bleed, can take 81 mg    Eliquis [apixaban] Other (See Comments)     GI Bleed; severe bleeding from nose & mouth        Immunization History   Administered Date(s) Administered    COVID-19, MRNA, LN-S, PF (Pfizer) (Purple Cap) 01/27/2021, 02/24/2021    Pneumococcal Polysaccharide - 23 Valent 01/15/2016        Patient Care Team:  Leon Vaughan Sr., MD as PCP - General (Family Medicine)  Laura Johnson MD as Consulting Physician (Cardiology)  Ayo Colon MD as Consulting Physician (Cardiology)  Brielle Phelps MD as Consulting Physician (Ophthalmology)  James Landin MD (Otolaryngology)    Subjective:     Review of Systems   Constitutional:  Negative for activity change, appetite change, fever and  "unexpected weight change.   HENT:  Negative for congestion, dental problem, rhinorrhea and trouble swallowing.    Eyes:  Negative for visual disturbance.   Respiratory:  Negative for chest tightness and shortness of breath.    Cardiovascular:  Negative for chest pain, palpitations and leg swelling.   Gastrointestinal:  Negative for abdominal pain, blood in stool, constipation, diarrhea, nausea and vomiting.   Genitourinary:  Negative for difficulty urinating.   Musculoskeletal:  Negative for gait problem.   Skin:  Negative for rash and wound.   Neurological:  Negative for dizziness, syncope, speech difficulty, weakness and light-headedness.   Psychiatric/Behavioral:  Negative for confusion and suicidal ideas.        12 point review of systems conducted, negative except as stated in the history of present illness. See HPI for details.    Objective:     Visit Vitals  /76   Pulse 76   Temp 97.6 °F (36.4 °C)   Resp 20   Ht 5' 11" (1.803 m)   Wt 85.7 kg (189 lb)   SpO2 96%   BMI 26.36 kg/m²       Physical Exam  Vitals and nursing note reviewed.   Constitutional:       General: He is not in acute distress.     Appearance: Normal appearance. He is not ill-appearing, toxic-appearing or diaphoretic.   HENT:      Head: Normocephalic and atraumatic.      Right Ear: Tympanic membrane, ear canal and external ear normal. There is no impacted cerumen.      Left Ear: Tympanic membrane, ear canal and external ear normal. There is no impacted cerumen.      Nose: No congestion or rhinorrhea.      Mouth/Throat:      Pharynx: Oropharynx is clear. No oropharyngeal exudate or posterior oropharyngeal erythema.   Eyes:      General:         Right eye: No discharge.         Left eye: No discharge.      Extraocular Movements: Extraocular movements intact.      Conjunctiva/sclera: Conjunctivae normal.   Cardiovascular:      Rate and Rhythm: Normal rate and regular rhythm.      Heart sounds: No murmur heard.     No friction rub. No " gallop.   Pulmonary:      Effort: Pulmonary effort is normal. No respiratory distress.      Breath sounds: Normal breath sounds.   Musculoskeletal:      Right lower leg: No edema.      Left lower leg: No edema.   Skin:     Capillary Refill: Capillary refill takes less than 2 seconds.   Neurological:      Mental Status: He is alert and oriented to person, place, and time.   Psychiatric:         Mood and Affect: Mood normal.         Behavior: Behavior normal.         Thought Content: Thought content normal.           Screenings     The following assessments were completed and reviewed. See completed screening forms and assessments within the Encounter Summary.    [x] Health Risk Assessment   [x] CVD Risk Factors   [x] Obesity/Physical Activity -  Encouraged daily 30 minute physical activity x 5 days per week.   [x] Home Safety/Living Situation   [x] Alcohol Screen  [x] Depression (PHQ) Screen   [x] Timed Get Up and Go   [x] Whisper Test  [x] Cognitive Function/Impairment Screen   [x] Nutrition Screening  [x] ADL Screen   [x] Opioid Screen:  [x] Patient does not have a prescription for opioids.   [] Patient has a prescription for opioids but is at low risk for abuse.   [x] Substance Abuse Screen:   [x] Patient does not use substances.   [] Patient screens positive for substance use disorder.     Advanced Care Planning     Advance Care Planning   Advance Care Planning     Date: 08/29/2024    Code Status  Code status was discussed with patient today extensively.  Patient does not want CPR or temporary intubation should he go into cardiorespiratory failure.  He would like to be allowed a natural death without any intervention.    Healthcare power of /living will  Patient believes that he has these documents at home already.  He will get with his family and get copies of the documents and send them to us.  He reports that he would like his daughter to be his healthcare power-of- and also believes that these  documents are at home.  [facility] advance care planning forms sent home with patient in the event he does not already have such forms filled out.  So he we will either fill out the [facility] advanced care planning forearms, or provide us with copies of the forms that already exists.    A total of 10 min was spent on advance care planning, goals of care discussion, emotional support, formulating and communicating prognosis and exploring burden/benefit of various approaches of treatment. This discussion occurred on a fully voluntary basis with the verbal consent of the patient and/or family.        I attest to discussing Advance Care Planning with patient and/or family member.  Education was provided including the importance of the Health Care Power of , Advance Directives, and/or [place] documentation.  The patient expressed understanding to the importance of this information and discussion.       Assessment:       ICD-10-CM ICD-9-CM   1. Erectile dysfunction, unspecified erectile dysfunction type  N52.9 607.84        Plan:     1. Erectile dysfunction, unspecified erectile dysfunction type  Overview:  Continue with Cialis 10 mg daily PRN Erectile Dysfunction, no more than 2 a week.    Orders:  -     tadalafiL (CIALIS) 10 MG tablet; Take 1 tablet (10 mg total) by mouth daily as needed for Erectile Dysfunction.  Dispense: 5 tablet; Refill: 2       Patient declined all vaccines today.  He has an appointment in 2 months for his eye care.      Provided patient with a 5-10 year written screening schedule and personal prevention plan. Recommendations were developed using the USPSTF age appropriate recommendations. Education, counseling, and referrals were provided as needed. After Visit Summary printed and given to patient, which includes a list of additional screenings\tests needed.    No follow-ups on file. In addition to their scheduled follow up, the patient has also been instructed to follow up on as needed basis.      No future appointments.       Treence Adam MD

## 2024-09-04 ENCOUNTER — OFFICE VISIT (OUTPATIENT)
Dept: FAMILY MEDICINE | Facility: CLINIC | Age: 84
End: 2024-09-04
Payer: MEDICARE

## 2024-09-04 VITALS
BODY MASS INDEX: 26.46 KG/M2 | OXYGEN SATURATION: 96 % | HEIGHT: 71 IN | RESPIRATION RATE: 20 BRPM | DIASTOLIC BLOOD PRESSURE: 76 MMHG | HEART RATE: 76 BPM | WEIGHT: 189 LBS | TEMPERATURE: 98 F | SYSTOLIC BLOOD PRESSURE: 120 MMHG

## 2024-09-04 DIAGNOSIS — N52.9 ERECTILE DYSFUNCTION, UNSPECIFIED ERECTILE DYSFUNCTION TYPE: ICD-10-CM

## 2024-09-04 PROCEDURE — G0439 PPPS, SUBSEQ VISIT: HCPCS | Mod: ,,, | Performed by: FAMILY MEDICINE

## 2024-09-04 RX ORDER — TADALAFIL 10 MG/1
10 TABLET ORAL DAILY PRN
Qty: 5 TABLET | Refills: 2 | Status: SHIPPED | OUTPATIENT
Start: 2024-09-04

## 2024-10-11 DIAGNOSIS — F41.9 ANXIETY: ICD-10-CM

## 2024-10-11 NOTE — TELEPHONE ENCOUNTER
Patient is requesting refill on Xanax 0.5 mg 1 po q day prn anxiety    Last refill 12/19/2023   # 30 X 2      Last office visit 9/4/2024    Next appointment 3/5/2025

## 2024-10-15 DIAGNOSIS — F41.9 ANXIETY: ICD-10-CM

## 2024-10-15 RX ORDER — ALPRAZOLAM 0.5 MG/1
0.5 TABLET ORAL NIGHTLY PRN
Qty: 30 TABLET | Refills: 2 | OUTPATIENT
Start: 2024-10-15

## 2024-10-15 NOTE — TELEPHONE ENCOUNTER
Called [facility]  Dr Johnson office and asked for the last note (8/13/2024)  (see media for review)it does not mention Diltiazem as a active medication, I called his wife and she didn't see it included in his medication bottles at home either.

## 2024-10-16 RX ORDER — ALPRAZOLAM 0.5 MG/1
0.5 TABLET ORAL NIGHTLY PRN
Qty: 30 TABLET | Refills: 2 | Status: SHIPPED | OUTPATIENT
Start: 2024-10-16

## 2024-11-12 DIAGNOSIS — G25.81 RLS (RESTLESS LEGS SYNDROME): ICD-10-CM

## 2024-11-12 RX ORDER — PRAMIPEXOLE DIHYDROCHLORIDE 0.5 MG/1
.5-1 TABLET ORAL NIGHTLY
Qty: 180 TABLET | Refills: 0 | Status: SHIPPED | OUTPATIENT
Start: 2024-11-12 | End: 2025-02-10

## 2024-11-20 RX ORDER — MAGNESIUM GLUCONATE 27.5 (500)
1 TABLET ORAL DAILY
COMMUNITY

## 2024-11-21 ENCOUNTER — OFFICE VISIT (OUTPATIENT)
Dept: FAMILY MEDICINE | Facility: CLINIC | Age: 84
End: 2024-11-21
Payer: MEDICARE

## 2024-11-21 VITALS
OXYGEN SATURATION: 95 % | TEMPERATURE: 98 F | BODY MASS INDEX: 27.17 KG/M2 | RESPIRATION RATE: 20 BRPM | HEIGHT: 70 IN | HEART RATE: 88 BPM | WEIGHT: 189.81 LBS | DIASTOLIC BLOOD PRESSURE: 70 MMHG | SYSTOLIC BLOOD PRESSURE: 108 MMHG

## 2024-11-21 DIAGNOSIS — Z09 HOSPITAL DISCHARGE FOLLOW-UP: Primary | ICD-10-CM

## 2024-11-21 DIAGNOSIS — D50.0 BLOOD LOSS ANEMIA: ICD-10-CM

## 2024-11-21 DIAGNOSIS — K92.2 GASTROINTESTINAL HEMORRHAGE, UNSPECIFIED GASTROINTESTINAL HEMORRHAGE TYPE: ICD-10-CM

## 2024-11-21 RX ORDER — FERROUS SULFATE 325(65) MG
325 TABLET, DELAYED RELEASE (ENTERIC COATED) ORAL DAILY
Qty: 30 TABLET | Refills: 2 | Status: SHIPPED | OUTPATIENT
Start: 2024-11-21

## 2024-11-21 NOTE — PROGRESS NOTES
[facility]  Alivia Muñoz MD    Patient ID: [id number]     Chief Complaint: Hospital Follow Up (Patient admitted to [facility] on 11/11/2024  thru 11/14/2024 for rectal bleeding.)      HPI:     Rico Nagel is a 84 y.o. male with CAD, paroxysmal Afib, hypertension, hyperlipidemia, peripheral artery disease, GERD, type 2 diabetes, and previous GI bleed in 2022 here today for hospital follow-up.    Patient was admitted at [facility] from 11/11-11/14/2024 for an upper GI bleed after presenting to the ER with melena and coffee-ground emesis.  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  No active bleeding found on EGD.  GI recommended an outpatient esophageal ultrasound for further investigation of the submucosal lesion in the stomach which is scheduled for December 11th.  Colonoscopy on 11/13 showed 3 small AVMs that were actively using s/p ACP, moderate diverticulosis, and a 4 mm sessile polyp s/p cold snare resection.  A capsule study was also done, and results are pending.  Patient was discharged on Protonix 40 mg b.i.d. Hemoglobin on the day of discharge was 8.8.    Patient is feeling much better, but still feels a little weak.  He denies any blood loss in the stool or through vomit since discharge.  He was advised to continue his aspirin 325 mg daily.  Patient drinks a glass of whiskey about 3 times per week and occasionally beer.  Prior to this episode he had 6 beers, according to the ER note.    Past Medical History:   Diagnosis Date    Ambulates with cane     Anxiety disorder, unspecified     Arthritis     Atrial fibrillation     Carotid artery stenosis     CHF (congestive heart failure)     Coronary artery disease     Diabetes mellitus     Eczema     GI bleeding     x3 hospitalizations within last 5 years    History of  kidney stones     Hypertension     Imaging of gastrointestinal tract abnormal     Insomnia     Iron deficiency anemia, unspecified     PAD (peripheral artery disease)     Restless leg syndrome     Sleep apnea     does not use CPAP since recall    Unspecified glaucoma     Unspecified macular degeneration 2021    Left Eye   Dr Brielle Harvey    Vitamin D deficiency         Past Surgical History:   Procedure Laterality Date    CARDIOVERSION  08/08/2022    CLOSURE OF LEFT ATRIAL APPENDAGE USING DEVICE N/A 03/28/2023    Procedure: Left atrial appendage closure device;  Surgeon: Ayo Colon MD;  Location: [facility] CATH LAB;  Service: Cardiology;  Laterality: N/A;  AMULET W/ INTRA OP DELORIS    COLONOSCOPY  11/12/2024    OLOL    CORONARY ANGIOPLASTY WITH STENT PLACEMENT N/A 01/2015    LO to LAD & BMS to RPLA    CYSTOSCOPY W/ STONE MANIPULATION N/A     ECHOCARDIOGRAM, TRANSESOPHAGEAL  05/04/2023    Dr. Colon    ECHOCARDIOGRAM,TRANSESOPHAGEAL N/A 03/28/2023    Procedure: Transesophageal echo (DELORIS) intra-procedure log documentation;  [facility];  Location: [facility] CATH LAB;  Service: Cardiology;  Laterality: N/A;    ESOPHAGOGASTRODUODENOSCOPY  08/10/2022    EXTERNAL EAR SURGERY Left 11/2023    EYE SURGERY Bilateral     Cataract & Glaucoma    KIDNEY STONE SURGERY      KNEE ARTHROPLASTY Left     LEFT HEART CATHETERIZATION Left 06/26/2023    Procedure: Left heart cath;  Surgeon: Laura Johnson MD;  Location: [facility] CATH LAB;  Service: Cardiology;  Laterality: Left;  LHC VIA RT GROIN    PERCUTANEOUS NEPHROLITHOTOMY          Social History     Tobacco Use    Smoking status: Some Days     Types: Cigars    Smokeless tobacco: Never   Substance and Sexual Activity    Alcohol use: Yes     Alcohol/week: 4.0 standard drinks of alcohol     Types: 4 Shots of liquor per week     Comment: 3x week (not lately)    Drug use: Never    Sexual activity: Not Currently        Current Outpatient Medications   Medication  "Instructions    ALPRAZolam (XANAX) 0.5 mg, Oral, Nightly PRN    aspirin (ECOTRIN) 325 mg, Daily    atorvastatin (LIPITOR) 20 mg, Nightly    CONTOUR NEXT TEST STRIPS Strp USE TO TEST BLOOD GLUCOSE ONCE A DAY    ENTRESTO 24-26 mg per tablet 1 tablet, 2 times daily    ferrous sulfate 325 mg, Oral, Daily    magnesium gluconate 27.5 mg magne- sium (500 mg) Tab 1 tablet, Daily    metoprolol succinate (TOPROL-XL) 12.5 mg, Oral, 2 times daily    mv-mn/om3/dha/epa/fish/lut/nasima (OCUVITE ADULT 50 PLUS ORAL) 1 tablet, Nightly    pantoprazole (PROTONIX) 40 mg, Oral, Daily    pramipexole (MIRAPEX) 0.5-1 mg, Oral, Nightly    tadalafiL (CIALIS) 10 mg, Oral, Daily PRN    ZIOPTAN, PF, 0.0015 % Dpet 1 drop, Nightly       Review of patient's allergies indicates:   Allergen Reactions    Eliquis [apixaban] Other (See Comments)     GI Bleed; severe bleeding from nose & mouth        Patient Care Team:  Leon Vaughan Sr., MD as PCP - General (Family Medicine)  Laura Johnson MD as Consulting Physician (Cardiology)  Ayo Colon MD as Consulting Physician (Cardiology)  Brielle Phelps MD as Consulting Physician (Ophthalmology)  James Landin MD (Otolaryngology)  Arden Harvey MD as Consulting Physician (Ophthalmology)     Subjective:     Review of Systems    12 point review of systems conducted, negative except as stated in the history of present illness. See HPI for details.    Objective:     Visit Vitals  /70 (Patient Position: Sitting)   Pulse 88   Temp 97.8 °F (36.6 °C)   Resp 20   Ht 5' 10" (1.778 m)   Wt 86.1 kg (189 lb 12.8 oz)   SpO2 95%   BMI 27.23 kg/m²       Physical Exam  Vitals and nursing note reviewed.   Constitutional:       General: He is not in acute distress.     Appearance: He is not ill-appearing.   HENT:      Head: Normocephalic and atraumatic.      Mouth/Throat:      Mouth: Mucous membranes are moist.      Pharynx: Oropharynx is clear.   Eyes:      General: No scleral icterus.     Extraocular " Movements: Extraocular movements intact.      Conjunctiva/sclera: Conjunctivae normal.   Neck:      Vascular: No carotid bruit.   Cardiovascular:      Rate and Rhythm: Normal rate and regular rhythm.      Heart sounds: No murmur heard.     No friction rub. No gallop.   Pulmonary:      Effort: Pulmonary effort is normal. No respiratory distress.      Breath sounds: Normal breath sounds. No wheezing, rhonchi or rales.   Musculoskeletal:         General: Normal range of motion.      Cervical back: Normal range of motion and neck supple.   Skin:     General: Skin is warm and dry.   Neurological:      General: No focal deficit present.      Mental Status: He is alert.   Psychiatric:         Mood and Affect: Mood normal.         Labs Reviewed:     Chemistry:  Lab Results   Component Value Date     11/13/2024    K 4.0 11/13/2024    BUN 32 (H) 11/13/2024    CREATININE 0.81 11/13/2024    EGFRNORACEVR >60 12/11/2023    GLUCOSE 125 (H) 12/11/2023    CALCIUM 8.1 (L) 11/13/2024    ALKPHOS 93 12/11/2023    LABPROT 15.7 (H) 11/11/2024    ALBUMIN 3.7 12/11/2023    BILIDIR 0.5 (H) 06/13/2023    IBILI 1.00 (H) 06/13/2023    AST 21 12/11/2023    ALT 21 12/11/2023    MG 2.10 04/05/2023    AOWYQZYU58NO 53.6 06/13/2023    TSH 1.786 12/11/2023        Lab Results   Component Value Date    HGBA1C 6.3 06/03/2024        Hematology:  Lab Results   Component Value Date    WBC 7.26 12/11/2023    HGB 16.4 12/11/2023    HCT 50.5 12/11/2023     12/11/2023       Lipid Panel:  Lab Results   Component Value Date    CHOL 130 12/11/2023    HDL 55 12/11/2023    LDL 56.00 12/11/2023    TRIG 95 12/11/2023    TOTALCHOLEST 2 12/11/2023        Urine:  Lab Results   Component Value Date    APPEARANCEUA Clear 03/27/2023    SGUA >=1.040 (H) 03/27/2023    PROTEINUA 1+ (A) 03/27/2023    KETONESUA Negative 03/27/2023    LEUKOCYTESUR Negative 03/27/2023    RBCUA <5 03/27/2023    WBCUA <5 03/27/2023    BACTERIA None Seen 03/27/2023        Assessment:        ICD-10-CM ICD-9-CM   1. Hospital discharge follow-up  Z09 V67.59   2. Gastrointestinal hemorrhage, unspecified gastrointestinal hemorrhage type  K92.2 578.9   3. Blood loss anemia  D50.0 280.0        Plan:     1. Hospital discharge follow-up    2. Gastrointestinal hemorrhage, unspecified gastrointestinal hemorrhage type  Assessment & Plan:  Patient was admitted at [facility] from 11/11-11/14/2024 for an upper GI bleed after presenting to the ER with melena and coffee-ground emesis.  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  No active bleeding found on EGD.  GI recommended an outpatient esophageal ultrasound for further investigation of the submucosal lesion in the stomach which is scheduled for December 11th.  Colonoscopy on 11/13 showed 3 small AVMs that were actively using s/p ACP, moderate diverticulosis, and a 4 mm sessile polyp s/p cold snare resection.  A capsule study was also done, and results are pending.  Patient has had no blood in his stool since discharge, and is feeling better.  Continue Protonix 40 mg b.i.d. and follow up with GI as scheduled.  Patient was advised to avoid NSAIDs and alcohol.      3. Blood loss anemia  -     CBC Auto Differential; Future; Expected date: 11/21/2024  -     ferrous sulfate 325 (65 FE) MG EC tablet; Take 1 tablet (325 mg total) by mouth once daily.  Dispense: 30 tablet; Refill: 2         No follow-ups on file. In addition to their scheduled follow up, the patient has also been instructed to follow up on as needed basis.     Future Appointments   Date Time Provider Department Center   1/2/2025  9:30 AM PROVIDER, [facility] FAMILY MEDICINE NILE Wheeler   2/26/2025  8:20 AM LAB, [facility] FAMILY MED [facility] CINTHIA Wheeler   3/5/2025 10:30 AM Leon Vaughan Sr., MD NILE VICENTE  Liam   8/27/2025  8:00 AM LAB, [facility] FAMILY [facility] NILE Wheeler   9/9/2025  9:30 AM Leon Vaughan Sr., MD ANDRÉS Muñoz MD

## 2024-11-21 NOTE — ASSESSMENT & PLAN NOTE
Patient was admitted at [facility] from 11/11-11/14/2024 for an upper GI bleed after presenting to the ER with melena and coffee-ground emesis.  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  No active bleeding found on EGD.  GI recommended an outpatient esophageal ultrasound for further investigation of the submucosal lesion in the stomach which is scheduled for December 11th.  Colonoscopy on 11/13 showed 3 small AVMs that were actively using s/p ACP, moderate diverticulosis, and a 4 mm sessile polyp s/p cold snare resection.  A capsule study was also done, and results are pending.  Patient has had no blood in his stool since discharge, and is feeling better.  Continue Protonix 40 mg b.i.d. and follow up with GI as scheduled.  Patient was advised to avoid NSAIDs and alcohol.

## 2024-11-25 LAB
LEFT EYE DM RETINOPATHY: NEGATIVE
RIGHT EYE DM RETINOPATHY: NEGATIVE

## 2024-12-03 ENCOUNTER — ANESTHESIA EVENT (OUTPATIENT)
Dept: SURGERY | Facility: HOSPITAL | Age: 84
End: 2024-12-03
Payer: MEDICARE

## 2024-12-03 NOTE — CLINICAL REVIEW
Received fax from [place] on 10/16/19, asking if Fluoxetine 10 mg can be changed to capsules instead of the tablets that were ordered by Ottoniel Man on 9/30/19 #30 with 1 refill. Insurance will not pay for tablets.   The medication is on hold and no thinners. no recent preop testing. cardiology notes utd. Echo/Stress noted. chart review complete. ccv

## 2024-12-10 NOTE — PRE-PROCEDURE INSTRUCTIONS
[facility]: Outpatient Surgery   Preprocedure Check-In Instructions       Your arrival time for your surgery or procedure is 8:30am.     We ask patients to arrive about 2 hours before surgery to allow for enough time to review your health history & medications, start your IV, complete any outstanding labwork or tests, and meet your Anesthesiologist.     You will arrive at [facility], [address] [place] [address]. Enter through the [place] entrance next to the Emergency Room, and come to the 6th floor to the Outpatient Surgery Department. If you need a wheelchair, please call [phone] for an attendant to meet you at the [place] entrance with a wheelchair.    Wait Times:  Due to inconsistent procedure completion times, an unexpected wait may occur.  The physicians would like you to be here in the event they run ahead of time.  We will keep you comfortable and informed while you are waiting.  We apologize in advance if this happens.    Visitory Policy:   You are allowed 2 adult visitors to be with you in the hospital. All hospital visitors should be in good current health. No small children.     What to Bring:   Please have your ID, insurance cards, and all home medication bottles with you at check in. Bring your CPAP machine if one is used at home.     Fasting:   No solid foods after MIDNIGHT.      Your anesthesia team strongly encourages you to HYDRATE prior to your surgery.  You may drink WATER, GATORADE, or POWERADE up until 2 hours prior to your arrival time.  Pediatric patients may drink Pedialyte or apple juice.    Your Arrival Time is 8:30am.  Stop drinking clear liquids at 6:30am.    Please contact Outpatient Surgery at [facility] at [phone] for any questions.      Follow your doctor's preoperative instructions regarding skin prep, bowel prep, bathing, or showering prior to your procedure. If any special soaps were provided to you, please use according to  your doctor's instructions. If no instructions were given from your doctor, take a good bath or shower with antibacterial soap the night before and the morning of your procedure. On the morning of procedure, wear loose, comfortable clothing. No lotions, makeup, perfumes, colognes, deodorant, or jewelry to your procedure. Removable items (glasses, contact lenses, dentures, retainers, hearing aids) need to be removed for your procedure. Bring your storage containers for these items if you wear them.     Artificial nails, body jewelry, eyelash extensions, and/or hair extensions with metal clips are not allowed during your surgery. If you currently wear any of these items, please arrange for them to be removed prior to your arrival to the hospital.     Outpatient or Same Day Surgeries:   Any patients receiving sedation/anesthesia are advised not to drive for 24 hours after their procedure. We do not allow patients to drive themselves home after discharge. If you are going home after your procedure, please have someone available to drive you home from the hospital.     You may call the Outpatient Surgery Department at [phone] with any questions or concerns. We are looking forward to meeting you and taking great care of you for your procedure. Thank you for choosing [facility] for your surgical needs.       Status: complete  Spoke with: patient  Call Time: 0804

## 2024-12-11 ENCOUNTER — ANESTHESIA (OUTPATIENT)
Dept: SURGERY | Facility: HOSPITAL | Age: 84
End: 2024-12-11
Payer: MEDICARE

## 2024-12-11 ENCOUNTER — HOSPITAL ENCOUNTER (OUTPATIENT)
Facility: HOSPITAL | Age: 84
Discharge: HOME | End: 2024-12-11
Attending: INTERNAL MEDICINE | Admitting: INTERNAL MEDICINE
Payer: MEDICARE

## 2024-12-11 DIAGNOSIS — D50.0 IRON DEFICIENCY ANEMIA DUE TO CHRONIC BLOOD LOSS: ICD-10-CM

## 2024-12-11 DIAGNOSIS — R93.3 IMAGING OF GASTROINTESTINAL TRACT ABNORMAL: Primary | ICD-10-CM

## 2024-12-11 LAB
BASOPHILS # BLD AUTO: 0.04 X10(3)/MCL
BASOPHILS NFR BLD AUTO: 0.6 %
EOSINOPHIL # BLD AUTO: 0.07 X10(3)/MCL (ref 0–0.9)
EOSINOPHIL NFR BLD AUTO: 1 %
ERYTHROCYTE [DISTWIDTH] IN BLOOD BY AUTOMATED COUNT: 14 % (ref 11.5–17)
HCT VFR BLD AUTO: 37.9 % (ref 42–52)
HGB BLD-MCNC: 12 G/DL (ref 14–18)
IMM GRANULOCYTES # BLD AUTO: 0.03 X10(3)/MCL (ref 0–0.04)
IMM GRANULOCYTES NFR BLD AUTO: 0.4 %
IRON SATN MFR SERPL: 20 % (ref 20–50)
IRON SERPL-MCNC: 45 UG/DL (ref 65–175)
LYMPHOCYTES # BLD AUTO: 1.15 X10(3)/MCL (ref 0.6–4.6)
LYMPHOCYTES NFR BLD AUTO: 17.2 %
MCH RBC QN AUTO: 33.4 PG (ref 27–31)
MCHC RBC AUTO-ENTMCNC: 31.7 G/DL (ref 33–36)
MCV RBC AUTO: 105.6 FL (ref 80–94)
MONOCYTES # BLD AUTO: 0.52 X10(3)/MCL (ref 0.1–1.3)
MONOCYTES NFR BLD AUTO: 7.8 %
NEUTROPHILS # BLD AUTO: 4.87 X10(3)/MCL (ref 2.1–9.2)
NEUTROPHILS NFR BLD AUTO: 73 %
NRBC BLD AUTO-RTO: 0 %
PLATELET # BLD AUTO: 181 X10(3)/MCL (ref 130–400)
PMV BLD AUTO: 9.6 FL (ref 7.4–10.4)
POCT GLUCOSE: 139 MG/DL (ref 70–110)
RBC # BLD AUTO: 3.59 X10(6)/MCL (ref 4.7–6.1)
TIBC SERPL-MCNC: 181 UG/DL (ref 60–240)
TIBC SERPL-MCNC: 226 UG/DL (ref 250–450)
TRANSFERRIN SERPL-MCNC: 204 MG/DL
WBC # BLD AUTO: 6.68 X10(3)/MCL (ref 4.5–11.5)

## 2024-12-11 PROCEDURE — 85025 COMPLETE CBC W/AUTO DIFF WBC: CPT | Performed by: INTERNAL MEDICINE

## 2024-12-11 PROCEDURE — 43239 EGD BIOPSY SINGLE/MULTIPLE: CPT | Performed by: INTERNAL MEDICINE

## 2024-12-11 PROCEDURE — 43259 EGD US EXAM DUODENUM/JEJUNUM: CPT | Performed by: INTERNAL MEDICINE

## 2024-12-11 PROCEDURE — 27201423 OPTIME MED/SURG SUP & DEVICES STERILE SUPPLY: Performed by: INTERNAL MEDICINE

## 2024-12-11 PROCEDURE — 37000008 HC ANESTHESIA 1ST 15 MINUTES: Performed by: INTERNAL MEDICINE

## 2024-12-11 PROCEDURE — 25000003 PHARM REV CODE 250

## 2024-12-11 PROCEDURE — 36415 COLL VENOUS BLD VENIPUNCTURE: CPT | Performed by: INTERNAL MEDICINE

## 2024-12-11 PROCEDURE — 82962 GLUCOSE BLOOD TEST: CPT | Performed by: INTERNAL MEDICINE

## 2024-12-11 PROCEDURE — 37000009 HC ANESTHESIA EA ADD 15 MINS: Performed by: INTERNAL MEDICINE

## 2024-12-11 PROCEDURE — C1726 CATH, BAL DIL, NON-VASCULAR: HCPCS | Performed by: INTERNAL MEDICINE

## 2024-12-11 PROCEDURE — 88342 IMHCHEM/IMCYTCHM 1ST ANTB: CPT

## 2024-12-11 PROCEDURE — 88313 SPECIAL STAINS GROUP 2: CPT

## 2024-12-11 PROCEDURE — 63600175 PHARM REV CODE 636 W HCPCS

## 2024-12-11 PROCEDURE — 88305 TISSUE EXAM BY PATHOLOGIST: CPT | Performed by: INTERNAL MEDICINE

## 2024-12-11 PROCEDURE — 83540 ASSAY OF IRON: CPT | Performed by: INTERNAL MEDICINE

## 2024-12-11 RX ORDER — GLUCAGON 1 MG
1 KIT INJECTION
OUTPATIENT
Start: 2024-12-11

## 2024-12-11 RX ORDER — GLYCOPYRROLATE 0.2 MG/ML
INJECTION INTRAMUSCULAR; INTRAVENOUS
Status: DISCONTINUED | OUTPATIENT
Start: 2024-12-11 | End: 2024-12-11

## 2024-12-11 RX ORDER — LIDOCAINE HYDROCHLORIDE 20 MG/ML
INJECTION, SOLUTION EPIDURAL; INFILTRATION; INTRACAUDAL; PERINEURAL
Status: DISCONTINUED | OUTPATIENT
Start: 2024-12-11 | End: 2024-12-11

## 2024-12-11 RX ORDER — PROPOFOL 10 MG/ML
VIAL (ML) INTRAVENOUS CONTINUOUS PRN
Status: DISCONTINUED | OUTPATIENT
Start: 2024-12-11 | End: 2024-12-11

## 2024-12-11 RX ORDER — ONDANSETRON HYDROCHLORIDE 2 MG/ML
4 INJECTION, SOLUTION INTRAVENOUS DAILY PRN
OUTPATIENT
Start: 2024-12-11

## 2024-12-11 RX ORDER — PROCHLORPERAZINE EDISYLATE 5 MG/ML
5 INJECTION INTRAMUSCULAR; INTRAVENOUS EVERY 30 MIN PRN
OUTPATIENT
Start: 2024-12-11

## 2024-12-11 RX ORDER — SODIUM CHLORIDE, SODIUM GLUCONATE, SODIUM ACETATE, POTASSIUM CHLORIDE AND MAGNESIUM CHLORIDE 30; 37; 368; 526; 502 MG/100ML; MG/100ML; MG/100ML; MG/100ML; MG/100ML
INJECTION, SOLUTION INTRAVENOUS CONTINUOUS
OUTPATIENT
Start: 2024-12-11 | End: 2025-01-10

## 2024-12-11 RX ORDER — LIDOCAINE HYDROCHLORIDE 10 MG/ML
1 INJECTION, SOLUTION EPIDURAL; INFILTRATION; INTRACAUDAL; PERINEURAL ONCE
OUTPATIENT
Start: 2024-12-11 | End: 2024-12-11

## 2024-12-11 RX ORDER — DIPHENHYDRAMINE HYDROCHLORIDE 50 MG/ML
25 INJECTION INTRAMUSCULAR; INTRAVENOUS EVERY 6 HOURS PRN
OUTPATIENT
Start: 2024-12-11

## 2024-12-11 RX ORDER — IPRATROPIUM BROMIDE AND ALBUTEROL SULFATE 2.5; .5 MG/3ML; MG/3ML
3 SOLUTION RESPIRATORY (INHALATION)
OUTPATIENT
Start: 2024-12-11

## 2024-12-11 RX ORDER — MIDAZOLAM HYDROCHLORIDE 1 MG/ML
INJECTION INTRAMUSCULAR; INTRAVENOUS
Status: DISCONTINUED | OUTPATIENT
Start: 2024-12-11 | End: 2024-12-11

## 2024-12-11 RX ADMIN — SODIUM CHLORIDE: 9 INJECTION, SOLUTION INTRAVENOUS at 10:12

## 2024-12-11 RX ADMIN — PROPOFOL 100 MCG/KG/MIN: 10 INJECTION, EMULSION INTRAVENOUS at 10:12

## 2024-12-11 RX ADMIN — PROPOFOL 30 MG: 10 INJECTION, EMULSION INTRAVENOUS at 10:12

## 2024-12-11 RX ADMIN — MIDAZOLAM HYDROCHLORIDE 2 MG: 1 INJECTION, SOLUTION INTRAMUSCULAR; INTRAVENOUS at 10:12

## 2024-12-11 RX ADMIN — PROPOFOL 60 MG: 10 INJECTION, EMULSION INTRAVENOUS at 10:12

## 2024-12-11 RX ADMIN — LIDOCAINE HYDROCHLORIDE 40 MG: 20 INJECTION, SOLUTION INTRAVENOUS at 10:12

## 2024-12-11 RX ADMIN — GLYCOPYRROLATE 0.2 MG: 0.2 INJECTION INTRAMUSCULAR; INTRAVENOUS at 10:12

## 2024-12-11 NOTE — ANESTHESIA PREPROCEDURE EVALUATION
12/11/2024  Rico Nagel is a 84 y.o., male.      Rico Nagel    Pre-op Diagnosis: Imaging of gastrointestinal tract abnormal [R93.3]    Procedure(s):  UPPER EUS W/ POSS FNA     Review of patient's allergies indicates:   Allergen Reactions    Eliquis [apixaban] Other (See Comments)     GI Bleed; severe bleeding from nose & mouth       Current Outpatient Medications   Medication Instructions    ALPRAZolam (XANAX) 0.5 mg, Oral, Nightly PRN    aspirin (ECOTRIN) 325 mg, Daily    atorvastatin (LIPITOR) 20 mg, Nightly    CONTOUR NEXT TEST STRIPS Strp USE TO TEST BLOOD GLUCOSE ONCE A DAY    ENTRESTO 24-26 mg per tablet 1 tablet, 2 times daily    ferrous sulfate 325 mg, Oral, Daily    magnesium gluconate 27.5 mg magne- sium (500 mg) Tab 1 tablet, Daily    metoprolol succinate (TOPROL-XL) 12.5 mg, Oral, 2 times daily    mv-mn/om3/dha/epa/fish/lut/nasima (OCUVITE ADULT 50 PLUS ORAL) 1 tablet, Nightly    pantoprazole (PROTONIX) 40 mg, Oral, Daily    pramipexole (MIRAPEX) 0.5-1 mg, Oral, Nightly    tadalafiL (CIALIS) 10 mg, Oral, Daily PRN    ZIOPTAN, PF, 0.0015 % Dpet 1 drop, Nightly       CO UPGI ENDOSCOPY,FN NEEDLE BX,GUIDED [30753] (UPPER EUS W/*    Past Medical History:   Diagnosis Date    Ambulates with cane     Anxiety disorder, unspecified     Arthritis     Atrial fibrillation     Carotid artery stenosis     CHF (congestive heart failure)     Coronary artery disease     Diabetes mellitus     Eczema     GI bleeding     x3 hospitalizations within last 5 years    History of kidney stones     Hypertension     Imaging of gastrointestinal tract abnormal     Insomnia     Iron deficiency anemia, unspecified     PAD (peripheral artery disease)     Restless leg syndrome     Sleep apnea     does not use CPAP since recall    Unspecified glaucoma     Unspecified macular degeneration 2021    Left Eye   Dr Brielle Phelps      Dr Harvey    Vitamin D deficiency    GLAUCOMA OD    Past Surgical History:   Procedure Laterality Date    CARDIOVERSION  08/08/2022    CLOSURE OF LEFT ATRIAL APPENDAGE USING DEVICE N/A 03/28/2023    Procedure: Left atrial appendage closure device;  Surgeon: Ayo Colon MD;  Location: [facility] CATH LAB;  Service: Cardiology;  Laterality: N/A;  AMULET W/ INTRA OP DELORIS    COLONOSCOPY  11/12/2024    OLOL    CORONARY ANGIOPLASTY WITH STENT PLACEMENT N/A 01/2015    LO to LAD & BMS to RPLA    CYSTOSCOPY W/ STONE MANIPULATION N/A     ECHOCARDIOGRAM, TRANSESOPHAGEAL  05/04/2023    Dr. Colon    ECHOCARDIOGRAM,TRANSESOPHAGEAL N/A 03/28/2023    Procedure: Transesophageal echo (DELORIS) intra-procedure log documentation;  Surgeon: [facility] Diagnostic Provider;  Location: [facility] CATH LAB;  Service: Cardiology;  Laterality: N/A;    ESOPHAGOGASTRODUODENOSCOPY  08/10/2022    EXTERNAL EAR SURGERY Left 11/2023    EYE SURGERY Bilateral     Cataract & Glaucoma    KIDNEY STONE SURGERY      KNEE ARTHROPLASTY Left     LEFT HEART CATHETERIZATION Left 06/26/2023    Procedure: Left heart cath;  Surgeon: Laura Johnson MD;  Location: [facility] CATH LAB;  Service: Cardiology;  Laterality: Left;  LHC VIA RT GROIN    PERCUTANEOUS NEPHROLITHOTOMY       Lab Results   Component Value Date    WBC 7.26 12/11/2023    HGB 16.4 12/11/2023    HCT 50.5 12/11/2023    .4 (H) 12/11/2023     12/11/2023          BMP  Lab Results   Component Value Date     11/13/2024    K 4.0 11/13/2024     (H) 11/13/2024    CO2 25 11/13/2024    BUN 32 (H) 11/13/2024    CREATININE 0.81 11/13/2024    CALCIUM 8.1 (L) 11/13/2024    ANIONGAP 7 (L) 11/13/2024    ESTGFRAFRICA 87 11/13/2024     CARDIAC CATH 6/26/2023         DELORIS 5/4/2023 Summary  The left ventricle is normal in size with normal systolic function.  A diastolic pattern consistent with atrial fibrillation observed.  Moderate to severe left atrial enlargement.  Small interatrial septal defect present with left to  right shunting indicated by color flow Doppler.  Abnormal appearing left atrial appendage. Adequately deployed Amulet device without significant peridevice leak.  Mild right atrial enlargement.  Mild aortic regurgitation.  Mild-to-moderate mitral regurgitation.     TTE  03/29/23  Interpretation Summary  · Post Amulet implantation.  · No significant pericardial effusion or tamponade physiology.  · Mild-to-moderate aortic regurgitation.  · Mild mitral regurgitation.  · Mild tricuspid regurgitation.       ECG 5/4/2023  Vent. Rate : 095 BPM     Atrial Rate : 000 BPM      P-R Int : 000 ms          QRS Dur : 146 ms       QT Int : 406 ms       P-R-T Axes : 000 -55 123 degrees      QTc Int : 510 ms     Atrial fibrillation with a competing junctional pacemaker   Left axis deviation   Right bundle branch block   Minimal voltage criteria for LVH, may be normal variant ( R in aVL )   T wave abnormality, consider lateral ischemia   Abnormal ECG   Confirmed by Gallo PATINO, Finn ([id number]) on 5/5/2023 2:04:12 AM     Pre-op Assessment    I have reviewed the Patient Summary Reports.    I have reviewed the NPO Status.   I have reviewed the Medications.     Review of Systems  Anesthesia Hx:  No problems with previous Anesthesia             Denies Family Hx of Anesthesia complications.    Denies Personal Hx of Anesthesia complications.                    Social:  Smoker Occ Cigar      Hematology/Oncology:       -- Anemia:                                  Cardiovascular:  Exercise tolerance: good   Hypertension   CAD    Dysrhythmias atrial fibrillation  Denies Angina. CHF  Denies Orthopnea.  Denies PND. PVD hyperlipidemia  Denies DOWLING.      Functional Capacity good / => 4 METS                         Pulmonary:        Sleep Apnea                Hepatic/GI:     GERD                Musculoskeletal:  Musculoskeletal Normal                Neurological:  Denies TIA.  Denies CVA.                                    Endocrine:  Diabetes            Psych:  Psychiatric Normal                    Physical Exam  General: Well nourished, Alert and Oriented    Airway:  Mallampati: III   Mouth Opening: Normal  TM Distance: Normal  Tongue: Normal  Neck ROM: Normal ROM    Dental:  Intact, Partial Dentures  LOWER PARTIAL OUT  Chest/Lungs:  Clear to auscultation    Heart:  Rate: Normal  Rhythm: Irregularly Irregular  Murmur: Systolic;  Systolic: L Upper Sternal Border, Grade II;  No pretibial edema  No carotid bruits      Anesthesia Plan  Type of Anesthesia, risks & benefits discussed:    Anesthesia Type: Gen Natural Airway  Intra-op Monitoring Plan: Standard ASA Monitors  Post Op Pain Control Plan: IV/PO Opioids PRN  Induction:  IV  Informed Consent: Informed consent signed with the Patient and all parties understand the risks and agree with anesthesia plan.  All questions answered. Patient consented to blood products? No  ASA Score: 3  Day of Surgery Review of History & Physical: H&P Update referred to the surgeon/provider.  Anesthesia Plan Notes: GA TIVA    Ready For Surgery From Anesthesia Perspective.     .

## 2024-12-11 NOTE — H&P
Endoscopy History and Physical    PCP - Leon Vaughan Sr., MD    Procedure - FABIANO  ASA & Mallampati - per anesthesia      HPI:  This is a 84 y.o. male here for evaluation of a submucosal gastric lesion incidentally found on recent EGD.       ROS:  Constitutional: No fevers, chills, No weight loss  ENT: No allergies  CV: No chest pain  Pulm: No shortness of breath  GI: see HPI  Derm: No rash    Medical History:  has a past medical history of Ambulates with cane, Anxiety disorder, unspecified, Arthritis, Atrial fibrillation, Carotid artery stenosis, CHF (congestive heart failure), Coronary artery disease, Diabetes mellitus, Eczema, GI bleeding, History of kidney stones, Hypertension, Imaging of gastrointestinal tract abnormal, Insomnia, Iron deficiency anemia, unspecified, PAD (peripheral artery disease), Restless leg syndrome, Sleep apnea, Unspecified glaucoma, Unspecified macular degeneration (2021), and Vitamin D deficiency.    Surgical History:  has a past surgical history that includes Eye surgery (Bilateral); Cystoscopy w/ stone manipulation (N/A); Coronary angioplasty with stent (N/A, 01/2015); Colonoscopy (11/12/2024); Esophagogastroduodenoscopy (08/10/2022); Closure of left atrial appendage using device (N/A, 03/28/2023); echocardiogram,transesophageal (N/A, 03/28/2023); Percutaneous nephrolithotomy; Cardioversion (08/08/2022); echocardiogram, transesophageal (05/04/2023); Knee Arthroplasty (Left); Kidney stone surgery; Left heart catheterization (Left, 06/26/2023); and External ear surgery (Left, 11/2023).    Family History: family history includes Endocrine tumor in his mother; Heart disease in his father.     Social History:  reports that he has been smoking cigars. He has never used smokeless tobacco. He reports current alcohol use of about 4.0 standard drinks of alcohol per week. He reports that he does not use drugs.    Review of patient's allergies indicates:   Allergen Reactions    Eliquis  [apixaban] Other (See Comments)     GI Bleed; severe bleeding from nose & mouth       Medications:   Medications Prior to Admission   Medication Sig Dispense Refill Last Dose/Taking    aspirin (ECOTRIN) 325 MG EC tablet Take 325 mg by mouth once daily.   11/30/2024    atorvastatin (LIPITOR) 20 MG tablet Take 20 mg by mouth every evening.   12/10/2024    CONTOUR NEXT TEST STRIPS Strp USE TO TEST BLOOD GLUCOSE ONCE A  each 1 Taking    ENTRESTO 24-26 mg per tablet Take 1 tablet by mouth 2 (two) times daily.   12/10/2024    ferrous sulfate 325 (65 FE) MG EC tablet Take 1 tablet (325 mg total) by mouth once daily. 30 tablet 2 Past Week    magnesium gluconate 27.5 mg magne- sium (500 mg) Tab Take 1 tablet by mouth Daily.   12/10/2024    metoprolol succinate (TOPROL-XL) 25 MG 24 hr tablet Take 12.5 mg by mouth 2 (two) times daily.   12/10/2024    pantoprazole (PROTONIX) 40 MG tablet Take 1 tablet (40 mg total) by mouth once daily. (Patient taking differently: Take 40 mg by mouth 2 (two) times daily.) 30 tablet 3 12/10/2024    pramipexole (MIRAPEX) 0.5 MG tablet Take 1-2 tablets (0.5-1 mg total) by mouth every evening. 180 tablet 0 12/10/2024    ZIOPTAN, PF, 0.0015 % Dpet Place 1 drop into the right eye every evening.   12/10/2024    ALPRAZolam (XANAX) 0.5 MG tablet Take 1 tablet (0.5 mg total) by mouth nightly as needed for Anxiety. 30 tablet 2 More than a month    mv-mn/om3/dha/epa/fish/lut/nasima (OCUVITE ADULT 50 PLUS ORAL) Take 1 tablet by mouth nightly.       tadalafiL (CIALIS) 10 MG tablet Take 1 tablet (10 mg total) by mouth daily as needed for Erectile Dysfunction. 5 tablet 2 Unknown         Objective Findings:    Vital Signs: see nursing notes  Physical Exam:  General Appearance: WM, well appearing in no acute distress  Neuro: A&O x 3, no focal deficits  Eyes:    No scleral icterus  ENT: Neck supple  Lungs: CTA anteriorly  Heart:  S1, S2 normal, no murmurs heard  Abdomen: Soft, non tender, non distended with  positive bowel sounds. No hepatosplenomegaly, ascites, or mass  Extremities: no edema  Skin: No rash      Labs:  Lab Results   Component Value Date    WBC 7.26 12/11/2023    HGB 16.4 12/11/2023    HCT 50.5 12/11/2023     12/11/2023    CHOL 130 12/11/2023    TRIG 95 12/11/2023    HDL 55 12/11/2023    ALT 21 12/11/2023    AST 21 12/11/2023     11/13/2024    K 4.0 11/13/2024     (H) 11/13/2024    CREATININE 0.81 11/13/2024    BUN 32 (H) 11/13/2024    CO2 25 11/13/2024    TSH 1.786 12/11/2023    INR 1.3 11/11/2024    HGBA1C 6.3 06/03/2024       I have explained the risks and benefits of endoscopy procedures to the patient including but not limited to bleeding, perforation, infection, and death.    Juma Hooker MD

## 2024-12-11 NOTE — PROVATION PATIENT INSTRUCTIONS
Discharge Summary/Instructions after an Endoscopic Procedure  Patient Name: Rico Nagel  Patient MRN: [id number]  Patient YOB: 1940 Wednesday, December 11, 2024  Juma Hooker MD  Dear patient,  As a result of recent federal legislation (The Federal Cures Act), you may   receive lab or pathology results from your procedure in your [facility]   account before your physician is able to contact you. Your physician or   their representative will relay the results to you with their   recommendations at their soonest availability.  Thank you,  RESTRICTIONS:  During your procedure today, you received medications for sedation.  These   medications may affect your judgment, balance and coordination.  Therefore,   for 24 hours, you have the following restrictions:   - DO NOT drive a car, operate machinery, make legal/financial decisions,   sign important papers or drink alcohol.    ACTIVITY:  Today: no heavy lifting, straining or running due to procedural   sedation/anesthesia.  The following day: return to full activity including work.  DIET:  Eat and drink normally unless instructed otherwise.     TREATMENT FOR COMMON SIDE EFFECTS:  - Mild abdominal pain, nausea, belching, bloating or excessive gas:  rest,   eat lightly and use a heating pad.  - Sore Throat: treat with throat lozenges and/or gargle with warm salt   water.  - Because air was used during the procedure, expelling large amounts of air   from your rectum or belching is normal.  - If a bowel prep was taken, you may not have a bowel movement for 1-3 days.    This is normal.  SYMPTOMS TO WATCH FOR AND REPORT TO YOUR PHYSICIAN:  1. Abdominal pain or bloating, other than gas cramps.  2. Chest pain.  3. Back pain.  4. Signs of infection such as: chills or fever occurring within 24 hours   after the procedure.  5. Rectal bleeding, which would show as bright red, maroon, or black stools.   (A tablespoon of blood from the rectum is not serious,  especially if   hemorrhoids are present.)  6. Vomiting.  7. Weakness or dizziness.  GO DIRECTLY TO THE NEAREST EMERGENCY ROOM IF YOU HAVE ANY OF THE FOLLOWING:      Difficulty breathing              Chills and/or fever over 101 F   Persistent vomiting and/or vomiting blood   Severe abdominal pain   Severe chest pain   Black, tarry stools   Bleeding- more than one tablespoon   Any other symptom or condition that you feel may need urgent attention  Your doctor recommends these additional instructions:  If any biopsies were taken, your doctors clinic will contact you in 1 to 2   weeks with any results.  - Discharge patient to home (with spouse).   - Resume previous diet today.   - Continue present medications.   - Observe patient's clinical course.   - Await path results.   - Return to nurse practitioner in 3 months.   - The findings and recommendations were discussed with the patient and their   spouse.  For questions, problems or results please call your physician - Juma Hooker MD at Work:  [phone].  [facility] ED at [phone]  IF A COMPLICATION OR EMERGENCY SITUATION ARISES AND YOU ARE UNABLE TO REACH   YOUR PHYSICIAN - GO DIRECTLY TO THE EMERGENCY ROOM.  Juma Hooker MD  12/11/2024 4:27:47 PM  This report has been verified and signed electronically.  Dear patient,  As a result of recent federal legislation (The Federal Cures Act), you may   receive lab or pathology results from your procedure in your [facility]   account before your physician is able to contact you. Your physician or   their representative will relay the results to you with their   recommendations at their soonest availability.  Thank you,  PROVATION

## 2024-12-11 NOTE — TRANSFER OF CARE
"Anesthesia Transfer of Care Note    Patient: Rico Nagel    Procedure(s) Performed: Procedure(s) (LRB):  UPPER EUS W/ POSS FNA (N/A)  EGD, WITH CLOSED BIOPSY    Patient location: [facility]    Anesthesia Type: general    Transport from OR: Transported from OR on room air with adequate spontaneous ventilation    Post pain: adequate analgesia    Post assessment: no apparent anesthetic complications and tolerated procedure well    Post vital signs: stable    Level of consciousness: responds to stimulation and awake    Nausea/Vomiting: no nausea/vomiting    Complications: none    Transfer of care protocol was followed      Last vitals: Visit Vitals  BP (!) 156/79   Pulse 81   Temp 36.6 °C (97.8 °F) (Oral)   Resp 18   Ht 5' 10" (1.778 m)   Wt 84 kg (185 lb 3 oz)   SpO2 97%   BMI 26.57 kg/m²     "

## 2024-12-11 NOTE — ANESTHESIA POSTPROCEDURE EVALUATION
Anesthesia Post Evaluation    Patient: Rico Nagel    Procedure(s) Performed: Procedure(s) (LRB):  UPPER EUS W/ POSS FNA (N/A)  EGD, WITH CLOSED BIOPSY    Final Anesthesia Type: general      Patient location during evaluation: PACU  Patient participation: Yes- Able to Participate  Level of consciousness: awake and alert and oriented  Post-procedure vital signs: reviewed and stable  Pain management: adequate  Airway patency: patent    PONV status at discharge: No PONV  Anesthetic complications: no      Cardiovascular status: hemodynamically stable  Respiratory status: unassisted  Hydration status: euvolemic  Follow-up not needed.              Vitals Value Taken Time   /75 12/11/24 1110   Temp 36.8 12/11/24 1129   Pulse 114 12/11/24 1110   Resp 16 12/11/24 1129   SpO2 93 % 12/11/24 1110         No case tracking events are documented in the log.      Pain/Mica Score: Mica Score: 9 (12/11/2024 11:10 AM)

## 2024-12-12 LAB — PSYCHE PATHOLOGY RESULT: NORMAL

## 2024-12-13 VITALS
RESPIRATION RATE: 20 BRPM | OXYGEN SATURATION: 95 % | BODY MASS INDEX: 26.51 KG/M2 | HEIGHT: 70 IN | HEART RATE: 94 BPM | SYSTOLIC BLOOD PRESSURE: 147 MMHG | WEIGHT: 185.19 LBS | TEMPERATURE: 98 F | DIASTOLIC BLOOD PRESSURE: 89 MMHG

## 2024-12-16 DIAGNOSIS — E11.9 TYPE 2 DIABETES MELLITUS WITHOUT COMPLICATION, WITHOUT LONG-TERM CURRENT USE OF INSULIN: Primary | ICD-10-CM

## 2024-12-16 RX ORDER — BLOOD-GLUCOSE CONTROL, NORMAL
1 EACH MISCELLANEOUS DAILY
Qty: 100 EACH | Refills: 0 | Status: SHIPPED | OUTPATIENT
Start: 2024-12-16 | End: 2025-12-16

## 2025-01-02 ENCOUNTER — OFFICE VISIT (OUTPATIENT)
Dept: FAMILY MEDICINE | Facility: CLINIC | Age: 85
End: 2025-01-02
Payer: MEDICARE

## 2025-01-02 VITALS
WEIGHT: 195.38 LBS | HEIGHT: 70 IN | RESPIRATION RATE: 18 BRPM | TEMPERATURE: 98 F | HEART RATE: 88 BPM | DIASTOLIC BLOOD PRESSURE: 82 MMHG | OXYGEN SATURATION: 97 % | BODY MASS INDEX: 27.97 KG/M2 | SYSTOLIC BLOOD PRESSURE: 139 MMHG

## 2025-01-02 DIAGNOSIS — I48.11 LONGSTANDING PERSISTENT ATRIAL FIBRILLATION: ICD-10-CM

## 2025-01-02 DIAGNOSIS — L72.0 EPIDERMAL INCLUSION CYST: ICD-10-CM

## 2025-01-02 DIAGNOSIS — E11.9 TYPE 2 DIABETES MELLITUS WITHOUT COMPLICATION, WITHOUT LONG-TERM CURRENT USE OF INSULIN: ICD-10-CM

## 2025-01-02 DIAGNOSIS — E78.2 MIXED HYPERLIPIDEMIA: Primary | ICD-10-CM

## 2025-01-02 DIAGNOSIS — I10 ESSENTIAL HYPERTENSION, BENIGN: ICD-10-CM

## 2025-01-02 DIAGNOSIS — I50.9 CONGESTIVE HEART FAILURE, UNSPECIFIED HF CHRONICITY, UNSPECIFIED HEART FAILURE TYPE: ICD-10-CM

## 2025-01-02 PROBLEM — Z95.818 PRESENCE OF AMULET LEFT ATRIAL APPENDAGE CLOSURE DEVICE: Status: RESOLVED | Noted: 2023-04-19 | Resolved: 2025-01-02

## 2025-01-02 PROBLEM — K57.30 DIVERTICULOSIS OF COLON: Status: ACTIVE | Noted: 2025-01-02

## 2025-01-02 LAB
CREAT UR-MCNC: 24.5 MG/DL (ref 63–166)
HBA1C MFR BLD: 6.5 %
MICROALBUMIN UR-MCNC: 60.3 UG/ML
MICROALBUMIN/CREAT RATIO PNL UR: 246.1 MG/GM CR (ref 0–30)

## 2025-01-02 PROCEDURE — 82570 ASSAY OF URINE CREATININE: CPT

## 2025-01-02 RX ORDER — CYCLOPENTOLATE HYDROCHLORIDE 10 MG/ML
SOLUTION/ DROPS OPHTHALMIC
COMMUNITY
Start: 2024-08-13

## 2025-01-02 NOTE — PROGRESS NOTES
[facility]  Alivia Muñoz MD    Patient ID: [id number]     Chief Complaint: Follow-up (1 month follow up) and Consult (Consult about a bump on spine area x2 months that's been itchy)      HPI:     Rico Nagel is a 84 y.o. male with  CAD, paroxysmal Afib, hypertension, hyperlipidemia, peripheral artery disease, GERD, type 2 diabetes, and previous GI bleed in 2022 here today for follow-up.    Patient feels well today.  No more bright red blood in his stool.  His stools are black since he started taking the iron.    A1c 6.5 in clinic today.  He is currently not on any medications for diabetes.    Patient reports an itchy lump on his right mid back which has been present for a couple of months.  He denies any pain or drainage.    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    Restless leg syndrome     Sleep apnea     does not use CPAP since recall    Unspecified glaucoma     Unspecified macular degeneration 2021    Left Eye   Dr Brielle Harvey    Vitamin D deficiency         Past Surgical History:   Procedure Laterality Date    CARDIOVERSION  08/08/2022    CLOSURE OF LEFT ATRIAL APPENDAGE USING DEVICE N/A 03/28/2023    Procedure: Left atrial appendage closure device;  Surgeon: Ayo Colon MD;  Location: [facility] CATH LAB;  Service: Cardiology;  Laterality: N/A;  AMULET W/ INTRA OP DELORIS    COLON SURGERY      due to internal bleeding    COLONOSCOPY  11/12/2024    [facility]    CORONARY  ANGIOPLASTY WITH STENT PLACEMENT N/A 01/2015    LO to LAD & BMS to RPLA    CYSTOSCOPY W/ STONE MANIPULATION N/A     ECHOCARDIOGRAM, TRANSESOPHAGEAL  05/04/2023    Dr. Colon    ECHOCARDIOGRAM,TRANSESOPHAGEAL N/A 03/28/2023    Procedure: Transesophageal echo (DELORIS) intra-procedure log documentation;  Surgeon: Pee Diagnostic Provider;  Location: [facility] CATH LAB;  Service: Cardiology;  Laterality: N/A;    EGD, WITH CLOSED BIOPSY  12/11/2024    Procedure: EGD, WITH CLOSED BIOPSY;  Surgeon: Juma Hooker MD;  Location: [facility] OR;  Service: Gastroenterology;;    ESOPHAGOGASTRODUODENOSCOPY  08/10/2022    EXTERNAL EAR SURGERY Left 11/2023    EYE SURGERY Bilateral     Cataract & Glaucoma    KIDNEY STONE SURGERY      KNEE ARTHROPLASTY Left     LEFT HEART CATHETERIZATION Left 06/26/2023    Procedure: Left heart cath;  Surgeon: Laura Johnosn MD;  Location: [facility] CATH LAB;  Service: Cardiology;  Laterality: Left;  LHC VIA RT GROIN    PERCUTANEOUS NEPHROLITHOTOMY      ULTRASOUND, ENDOSCOPIC, WITH FINE NEEDLE ASPIRATION N/A 12/11/2024    Procedure: UPPER EUS W/ POSS FNA;  Surgeon: Juma Hooker MD;  Location: [facility];  Service: Gastroenterology;  Laterality: N/A;        Social History     Tobacco Use    Smoking status: Some Days     Types: Cigars    Smokeless tobacco: Never   Substance and Sexual Activity    Alcohol use: Yes     Alcohol/week: 4.0 standard drinks of alcohol     Types: 4 Shots of liquor per week     Comment: 3x week (not lately)    Drug use: Never    Sexual activity: Not Currently        Current Outpatient Medications   Medication Instructions    ALPRAZolam (XANAX) 0.5 mg, Oral, Nightly PRN    aspirin (ECOTRIN) 325 mg, Daily    atorvastatin (LIPITOR) 20 mg, Nightly    blood sugar diagnostic (TRUE METRIX GLUCOSE TEST STRIP) Strp Use one strip to check cbg once daily    CONTOUR NEXT TEST STRIPS Strp USE TO TEST BLOOD GLUCOSE ONCE A DAY    ENTRESTO 24-26 mg per tablet 1 tablet, 2 times daily    ferrous sulfate 325  "mg, Oral, Daily    lancets 30 gauge Misc 1 lancet , Misc.(Non-Drug; Combo Route), Daily    magnesium gluconate 27.5 mg magne- sium (500 mg) Tab 1 tablet, Daily    metoprolol succinate (TOPROL-XL) 12.5 mg, 2 times daily    ka-qb-AW-vit Q-wjvnu-smsj-zeax (OCUVITE EYE PLUS MULTI) 200- mcg Tab Ocuvite Eye Plus Multi 200 mcg-15 mcg-150 mcg tablet, [RxNorm: 0]    mv-mn/om3/dha/epa/fish/lut/nasima (OCUVITE ADULT 50 PLUS ORAL) 1 tablet, Nightly    pantoprazole (PROTONIX) 40 mg, Oral, Daily    pramipexole (MIRAPEX) 0.5-1 mg, Oral, Nightly    tadalafiL (CIALIS) 10 mg, Oral, Daily PRN    ZIOPTAN, PF, 0.0015 % Dpet 1 drop, Nightly       Review of patient's allergies indicates:   Allergen Reactions    Eliquis [apixaban] Other (See Comments)     GI Bleed; severe bleeding from nose & mouth        Patient Care Team:  Leon Vaughan Sr., MD as PCP - General (Family Medicine)  Laura Johnson MD as Consulting Physician (Cardiology)  Ayo Colon MD as Consulting Physician (Cardiology)  Brielle Phelps MD as Consulting Physician (Ophthalmology)  James Landin MD (Otolaryngology)  Arden Harvey MD as Consulting Physician (Ophthalmology)     Subjective:     Review of Systems    12 point review of systems conducted, negative except as stated in the history of present illness. See HPI for details.    Objective:     Visit Vitals  /82 (BP Location: Right arm, Patient Position: Sitting)   Pulse 88   Temp 97.7 °F (36.5 °C)   Resp 18   Ht 5' 10" (1.778 m)   Wt 88.6 kg (195 lb 6.4 oz)   SpO2 97%   BMI 28.04 kg/m²       Physical Exam  Vitals and nursing note reviewed.   Constitutional:       General: He is not in acute distress.     Appearance: Normal appearance. He is not ill-appearing or diaphoretic.   HENT:      Head: Normocephalic and atraumatic.      Mouth/Throat:      Mouth: Mucous membranes are moist.      Pharynx: Oropharynx is clear.   Eyes:      Extraocular Movements: Extraocular movements intact.      " Conjunctiva/sclera: Conjunctivae normal.   Cardiovascular:      Rate and Rhythm: Normal rate and regular rhythm.      Pulses: Normal pulses.      Heart sounds: No murmur heard.  Pulmonary:      Effort: Pulmonary effort is normal. No respiratory distress.      Breath sounds: Normal breath sounds. No wheezing, rhonchi or rales.   Musculoskeletal:      Right lower leg: No edema.      Left lower leg: No edema.   Skin:     General: Skin is warm and dry.      Comments: Noninflamed epidermoid cyst right mid back   Neurological:      General: No focal deficit present.      Mental Status: He is alert and oriented to person, place, and time. Mental status is at baseline.   Psychiatric:         Mood and Affect: Mood normal.         Labs Reviewed:     Chemistry:  Lab Results   Component Value Date     11/13/2024    K 4.0 11/13/2024    BUN 32 (H) 11/13/2024    CREATININE 0.81 11/13/2024    EGFRNORACEVR >60 12/11/2023    GLUCOSE 125 (H) 12/11/2023    CALCIUM 8.1 (L) 11/13/2024    ALKPHOS 93 12/11/2023    LABPROT 15.7 (H) 11/11/2024    ALBUMIN 3.7 12/11/2023    BILIDIR 0.5 (H) 06/13/2023    IBILI 1.00 (H) 06/13/2023    AST 21 12/11/2023    ALT 21 12/11/2023    MG 2.10 04/05/2023    [id number] 53.6 06/13/2023    TSH 1.786 12/11/2023        Lab Results   Component Value Date    HGBA1C 6.3 06/03/2024        Hematology:  Lab Results   Component Value Date    WBC 6.68 12/11/2024    HGB 12.0 (L) 12/11/2024    HCT 37.9 (L) 12/11/2024     12/11/2024       Lipid Panel:  Lab Results   Component Value Date    CHOL 130 12/11/2023    HDL 55 12/11/2023    LDL 56.00 12/11/2023    TRIG 95 12/11/2023    TOTALCHOLEST 2 12/11/2023        Urine:  Lab Results   Component Value Date    APPEARANCEUA Clear 03/27/2023    SGUA >=1.040 (H) 03/27/2023    PROTEINUA 1+ (A) 03/27/2023    KETONESUA Negative 03/27/2023    LEUKOCYTESUR Negative 03/27/2023    RBCUA <5 03/27/2023    WBCUA <5 03/27/2023    BACTERIA None Seen 03/27/2023         Assessment:       ICD-10-CM ICD-9-CM   1. Mixed hyperlipidemia  E78.2 272.2   2. Type 2 diabetes mellitus without complication, without long-term current use of insulin  E11.9 250.00   3. Congestive heart failure, unspecified HF chronicity, unspecified heart failure type  I50.9 428.0   4. Longstanding persistent atrial fibrillation  I48.11 427.31   5. Epidermal inclusion cyst  L72.0 706.2   6. Essential hypertension, benign  I10 401.1        Plan:     1. Mixed hyperlipidemia  Overview:  Hyperlipidemia Medications               atorvastatin (LIPITOR) 20 MG tablet Take 20 mg by mouth every evening.        Followed by Cardiology.  Stressed importance of dietary modifications. Follow a low cholesterol, low saturated fat diet with less that 200mg of cholesterol a day.  Avoid fried foods and high saturated fats (high saturated fats less than 7% of calories).  Add Flax Seed/Fish Oil supplements to diet. Increase dietary fiber.  Regular exercise can reduce LDL and raise HDL. Stressed importance of physical activity 5 times per week for 30 minutes per day.     Assessment & Plan:  Last LDL at goal of less than 70.  Repeat lipid panel.    Orders:  -     Lipid Panel; Future; Expected date: 04/02/2025    2. Type 2 diabetes mellitus without complication, without long-term current use of insulin  Overview:  Lab Results   Component Value Date    HGBA1C 6.3 06/03/2024    HGBA1C 6.1 12/11/2023    LDL 56.00 12/11/2023    CREATININE 0.81 11/13/2024        On ACE and Statin according to guidelines.  Discussed caution with SGLT2s + diuretics as concomitant use can cause volume depletion. Discussed caution with DPP-Ivs and HF risk.  Follow ADA Diet. Avoid soda, sweets, and limit carbs (no more than 45-50 grams per meal).  Maintain healthy weight with goal BMI <30.  Exercise 5 times per week for 30 minutes per day.  Stressed importance of daily foot exams.          Assessment & Plan:  A1c at goal of less than 7.  Continue ADA  diet.    Orders:  -     Microalbumin/Creatinine Ratio, Urine  -     POCT HEMOGLOBIN A1C  -     CBC Auto Differential; Future; Expected date: 04/02/2025  -     Comprehensive Metabolic Panel; Future; Expected date: 04/02/2025  -     Hemoglobin A1C; Future; Expected date: 04/02/2025    3. Congestive heart failure, unspecified HF chronicity, unspecified heart failure type  Overview:  Continue present med tx.  Patient's medications are listed above under the heading of hypertension.  Notify the clinic if you gain 3 or more pounds in one day or 5 or more pounds in one week.  Stressed importance of daily morning weights after urination but prior to breakfast on the same scale.  Low Sodium Diet (Dash Diet - less than 2 grams of sodium per day).  Follow a low cholesterol, low saturated fat diet with less that 200mg of cholesterol a day.  Cut down on alcohol if you have more than 1 drink a day (for women) or 2 drinks a day (for men).  Maintain healthy weight with goal BMI <30. Perform 30 minutes of daily physical activity 5 days per week.  Report to ER for chest pain, SOB, difficulty breathing, abdominal distention or significant edema to lower extremities.  Continue following up with Cardiology -    Assessment & Plan:  Compensated.  Continue current regimen.      4. Longstanding persistent atrial fibrillation  Overview:  Patient is not anticoagulated having undergone an Amulet procedure. He was instructed to report to the nearest emergency room for any chest pain ,shortness of breath or elevated heart rate i.e. greater than 100.  It was recommended that he stay away from all stimulant medication including over-the-counter cough and cold preparations that contain Sudafed. He was also warned against excessive caffeine intake. Patient is presently being followed by Cardiology.     Assessment & Plan:  Patient's AFib is rate controlled.      5. Epidermal inclusion cyst  Assessment & Plan:  Patient with an epidermal inclusion cyst  to right mid back.  Currently not inflamed.  Patient to follow up with his dermatologist for removal.      6. Essential hypertension, benign  Overview:  Hypertension Medications               ENTRESTO 24-26 mg per tablet Take 1 tablet by mouth 2 (two) times daily.    metoprolol succinate (TOPROL-XL) 25 MG 24 hr tablet Take 25 mg by mouth 2 (two) times daily.    diltiaZEM (TIAZAC) 180 MG Cs24 Take 180 mg by mouth Daily.    furosemide (LASIX) 20 MG tablet Take 20 mg by mouth 2 (two) times a day.        Followed by Cardiology.  Low Sodium Diet (DASH Diet - Less than 2 grams of sodium per day).  Monitor blood pressure daily and log. Report consistent numbers greater than 140/90.  Maintain healthy weight with goal BMI <30. Exercise 30 minutes per day, 5 days per week.    Assessment & Plan:  Blood pressure well-controlled at goal.  Continue current regimen           No follow-ups on file. In addition to their scheduled follow up, the patient has also been instructed to follow up on as needed basis.     Future Appointments   Date Time Provider Department Center   2/26/2025  8:20 AM LAB, [facility] NILE Wheeler   3/5/2025  9:40 AM PROVIDER, [facility] NILE Wheeler   8/27/2025  8:00 AM LAB, [facility] NILE Wheeler   9/9/2025  9:30 AM Leon Vaughan Sr., MD ANDRÉS Muñoz MD

## 2025-01-02 NOTE — LETTER
AUTHORIZATION FOR RELEASE OF   CONFIDENTIAL INFORMATION    Dear medical records,    We are seeing Rico Nagel, date of birth 1940, in the clinic at [facility]. Leon Vaughan Sr., MD is the patient's PCP. Rico Nagel has an outstanding lab/procedure at the time we reviewed his chart. In order to help keep his health information updated, he has authorized us to request the following medical record(s):        (  )  MAMMOGRAM                                      (  )  COLONOSCOPY      (  )  PAP SMEAR                                          (  )  OUTSIDE LAB RESULTS     (  )  DEXA SCAN                                          ( X )  EYE EXAM            (  )  FOOT EXAM                                          (  )  ENTIRE RECORD     (  )  OUTSIDE IMMUNIZATIONS                 (  )  _______________         Please fax records to [facility], Bourgeois, Leonard Jb. Sr., MD, [phone].     If you have any questions, please contact SUSAN Osborn  [phone].           Patient Name: Rico Nagel  : 1940  Patient Phone #: [phone]

## 2025-01-02 NOTE — ASSESSMENT & PLAN NOTE
Patient with an epidermal inclusion cyst to right mid back.  Currently not inflamed.  Patient to follow up with his dermatologist for removal.

## 2025-01-07 ENCOUNTER — DOCUMENTATION ONLY (OUTPATIENT)
Dept: FAMILY MEDICINE | Facility: CLINIC | Age: 85
End: 2025-01-07
Payer: MEDICARE

## 2025-01-31 DIAGNOSIS — G25.81 RLS (RESTLESS LEGS SYNDROME): ICD-10-CM

## 2025-01-31 RX ORDER — PRAMIPEXOLE DIHYDROCHLORIDE 0.5 MG/1
.5-1 TABLET ORAL NIGHTLY
Qty: 180 TABLET | Refills: 0 | Status: SHIPPED | OUTPATIENT
Start: 2025-01-31 | End: 2025-05-01

## 2025-02-20 DIAGNOSIS — D50.0 BLOOD LOSS ANEMIA: ICD-10-CM

## 2025-02-20 RX ORDER — FERROUS SULFATE 325(65) MG
325 TABLET, DELAYED RELEASE (ENTERIC COATED) ORAL DAILY
Qty: 30 TABLET | Refills: 2 | Status: SHIPPED | OUTPATIENT
Start: 2025-02-20

## 2025-03-24 ENCOUNTER — OFFICE VISIT (OUTPATIENT)
Dept: FAMILY MEDICINE | Facility: CLINIC | Age: 85
End: 2025-03-24
Payer: MEDICARE

## 2025-03-24 VITALS
HEIGHT: 70 IN | WEIGHT: 189.38 LBS | DIASTOLIC BLOOD PRESSURE: 78 MMHG | SYSTOLIC BLOOD PRESSURE: 138 MMHG | BODY MASS INDEX: 27.11 KG/M2 | HEART RATE: 74 BPM

## 2025-03-24 DIAGNOSIS — F41.9 ANXIETY: ICD-10-CM

## 2025-03-24 DIAGNOSIS — N52.9 ERECTILE DYSFUNCTION, UNSPECIFIED ERECTILE DYSFUNCTION TYPE: ICD-10-CM

## 2025-03-24 DIAGNOSIS — L84 FOOT CALLUS: Primary | ICD-10-CM

## 2025-03-24 RX ORDER — ALPRAZOLAM 0.5 MG/1
0.5 TABLET ORAL NIGHTLY PRN
Qty: 30 TABLET | Refills: 2 | Status: CANCELLED | OUTPATIENT
Start: 2025-03-24

## 2025-03-24 RX ORDER — ALPRAZOLAM 0.5 MG/1
0.5 TABLET ORAL NIGHTLY PRN
Qty: 30 TABLET | Refills: 2 | Status: SHIPPED | OUTPATIENT
Start: 2025-03-24

## 2025-03-24 RX ORDER — TADALAFIL 10 MG/1
10 TABLET ORAL DAILY PRN
Qty: 5 TABLET | Refills: 2 | Status: SHIPPED | OUTPATIENT
Start: 2025-03-24

## 2025-03-24 NOTE — PROGRESS NOTES
Family Medicine    Patient ID: [id number]     Chief Complaint: Medication Refill (Patient here for Xanax refill.) and Referral (Patient request  referral to podiatry. Dr Hilda Phoenix in [place] No Patient Care Coordination Note on file./ Complains of callus under second toe on left foot. /)      HPI:     Rico Nagel is a 84 y.o. male here today for med refill.      We are refilling his Xanax today.  He is getting it refilled about once every 2-1/2 months.  Discussed risks with the advancing age and benzos and he accepted risk.  We will leave to PCP to wean off.    Refill Cialis today     He has a callus on his left foot and we would like to see Podiatry.  It is moderately tender and painful when walking.  No signs of infection    MEDICAL HISTORY:       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    Restless leg syndrome     Sleep apnea     does not use CPAP since recall    Unspecified glaucoma     Unspecified macular degeneration 2021    Left Eye   Dr Brielle Harvey    Vitamin D deficiency         Past Surgical History:   Procedure Laterality Date    CARDIOVERSION  08/08/2022    CLOSURE OF LEFT ATRIAL APPENDAGE USING DEVICE N/A 03/28/2023    Procedure: Left atrial appendage closure device;  Surgeon: Ayo Colon MD;  Location: [facility] CATH LAB;  Service: Cardiology;  Laterality: N/A;  AMULET W/ INTRA OP DELORIS    COLON SURGERY      due to internal bleeding    COLONOSCOPY  11/12/2024     OLOL    CORONARY ANGIOPLASTY WITH STENT PLACEMENT N/A 01/2015    LO to LAD & BMS to RPLA    CYSTOSCOPY W/ STONE MANIPULATION N/A     ECHOCARDIOGRAM, TRANSESOPHAGEAL  05/04/2023    Dr. Colon    ECHOCARDIOGRAM,TRANSESOPHAGEAL N/A 03/28/2023    Procedure: Transesophageal echo (DELORIS) intra-procedure log documentation;  Surgeon: Pee Diagnostic Provider;  Location: [facility] CATH LAB;  Service: Cardiology;  Laterality: N/A;    EGD, WITH CLOSED BIOPSY  12/11/2024    Procedure: EGD, WITH CLOSED BIOPSY;  Surgeon: Juma Hooker MD;  Location: [facility] OR;  Service: Gastroenterology;;    ESOPHAGOGASTRODUODENOSCOPY  08/10/2022    EXTERNAL EAR SURGERY Left 11/2023    EYE SURGERY Bilateral     Cataract & Glaucoma    KIDNEY STONE SURGERY      KNEE ARTHROPLASTY Left     LEFT HEART CATHETERIZATION Left 06/26/2023    Procedure: Left heart cath;  Surgeon: Laura Johnson MD;  Location: [facility] CATH LAB;  Service: Cardiology;  Laterality: Left;  LHC VIA RT GROIN    PERCUTANEOUS NEPHROLITHOTOMY      ULTRASOUND, ENDOSCOPIC, WITH FINE NEEDLE ASPIRATION N/A 12/11/2024    Procedure: UPPER EUS W/ POSS FNA;  Surgeon: Juma Hooker MD;  Location: [facility];  Service: Gastroenterology;  Laterality: N/A;        Social History     Tobacco Use    Smoking status: Some Days     Types: Cigars    Smokeless tobacco: Never   Substance and Sexual Activity    Alcohol use: Yes     Alcohol/week: 4.0 standard drinks of alcohol     Types: 4 Shots of liquor per week     Comment: 3x week (not lately)    Drug use: Never    Sexual activity: Not Currently        Current Outpatient Medications   Medication Instructions    ALPRAZolam (XANAX) 0.5 mg, Oral, Nightly PRN    aspirin (ECOTRIN) 325 mg, Daily    atorvastatin (LIPITOR) 20 mg, Nightly    blood sugar diagnostic (TRUE METRIX GLUCOSE TEST STRIP) Strp Use one strip to check cbg once daily    CONTOUR NEXT TEST STRIPS Strp USE TO TEST BLOOD GLUCOSE ONCE A DAY    ENTRESTO 24-26 mg per tablet 1 tablet, 2 times daily     ferrous sulfate 325 mg, Oral, Daily    lancets 30 gauge Misc 1 lancet , Misc.(Non-Drug; Combo Route), Daily    magnesium gluconate 27.5 mg magne- sium (500 mg) Tab 1 tablet, Daily    metoprolol succinate (TOPROL-XL) 12.5 mg, 2 times daily    mm-kl-TZ-vit J-sdloo-ubzh-zeax (OCUVITE EYE PLUS MULTI) 200- mcg Tab Ocuvite Eye Plus Multi 200 mcg-15 mcg-150 mcg tablet, [RxNorm: 0]    mv-mn/om3/dha/epa/fish/lut/nasima (OCUVITE ADULT 50 PLUS ORAL) 1 tablet, Nightly    pantoprazole (PROTONIX) 40 mg, Oral, Daily    pramipexole (MIRAPEX) 0.5-1 mg, Oral, Nightly    tadalafiL (CIALIS) 10 mg, Oral, Daily PRN    ZIOPTAN, PF, 0.0015 % Dpet 1 drop, Nightly       Review of patient's allergies indicates:   Allergen Reactions    Eliquis [apixaban] Other (See Comments)     GI Bleed; severe bleeding from nose & mouth        Patient Care Team:  Leon Vaughan Sr., MD as PCP - General (Family Medicine)  Laura Johnson MD as Consulting Physician (Cardiology)  Ayo Colon MD as Consulting Physician (Cardiology)  Brielle Phelps MD as Consulting Physician (Ophthalmology)  James Landin MD (Otolaryngology)  Arden Harvey MD as Consulting Physician (Ophthalmology)     Subjective:     Review of Systems   Constitutional:  Negative for activity change, appetite change, fever and unexpected weight change.   HENT:  Negative for congestion, dental problem, rhinorrhea and trouble swallowing.    Eyes:  Negative for visual disturbance.   Respiratory:  Negative for chest tightness and shortness of breath.    Cardiovascular:  Negative for chest pain, palpitations and leg swelling.   Gastrointestinal:  Negative for abdominal pain, blood in stool, constipation, diarrhea, nausea and vomiting.   Genitourinary:  Negative for difficulty urinating.   Musculoskeletal:  Negative for gait problem.   Skin:  Negative for rash and wound.   Neurological:  Negative for dizziness, syncope, speech difficulty, weakness and light-headedness.  "  Psychiatric/Behavioral:  Negative for confusion and suicidal ideas.        12 point review of systems conducted, negative except as stated in the history of present illness. See HPI for details.    Objective:     Visit Vitals  /78 (Patient Position: Sitting)   Pulse 74   Ht 5' 10" (1.778 m)   Wt 85.9 kg (189 lb 6.4 oz)   BMI 27.18 kg/m²       Physical Exam  Vitals and nursing note reviewed.   Constitutional:       General: He is not in acute distress.     Appearance: Normal appearance. He is not ill-appearing, toxic-appearing or diaphoretic.   HENT:      Head: Normocephalic and atraumatic.      Right Ear: Tympanic membrane, ear canal and external ear normal. There is no impacted cerumen.      Left Ear: Tympanic membrane, ear canal and external ear normal. There is no impacted cerumen.      Nose: No congestion or rhinorrhea.      Mouth/Throat:      Pharynx: Oropharynx is clear. No oropharyngeal exudate or posterior oropharyngeal erythema.   Eyes:      General:         Right eye: No discharge.         Left eye: No discharge.      Extraocular Movements: Extraocular movements intact.      Conjunctiva/sclera: Conjunctivae normal.   Cardiovascular:      Rate and Rhythm: Normal rate and regular rhythm.      Heart sounds: No murmur heard.     No friction rub. No gallop.   Pulmonary:      Effort: Pulmonary effort is normal. No respiratory distress.      Breath sounds: Normal breath sounds.   Musculoskeletal:      Right lower leg: No edema.      Left lower leg: No edema.   Skin:     Capillary Refill: Capillary refill takes less than 2 seconds.   Neurological:      Mental Status: He is alert and oriented to person, place, and time.   Psychiatric:         Mood and Affect: Mood normal.         Behavior: Behavior normal.         Thought Content: Thought content normal.         Recent labs:     Chemistry:  Lab Results   Component Value Date     11/13/2024    K 4.0 11/13/2024    BUN 32 (H) 11/13/2024    CREATININE " 0.81 11/13/2024    EGFRNORACEVR >60 12/11/2023    GLUCOSE 125 (H) 12/11/2023    CALCIUM 8.1 (L) 11/13/2024    ALKPHOS 93 12/11/2023    LABPROT 15.7 (H) 11/11/2024    ALBUMIN 3.7 12/11/2023    BILIDIR 0.5 (H) 06/13/2023    IBILI 1.00 (H) 06/13/2023    AST 21 12/11/2023    ALT 21 12/11/2023    MG 2.10 04/05/2023    AHEAGGAH96NT 53.6 06/13/2023    TSH 1.786 12/11/2023        Lab Results   Component Value Date    HGBA1C 6.3 06/03/2024        Hematology:  Lab Results   Component Value Date    WBC 6.68 12/11/2024    HGB 12.0 (L) 12/11/2024    HCT 37.9 (L) 12/11/2024     12/11/2024       Lipid Panel:  Lab Results   Component Value Date    CHOL 130 12/11/2023    HDL 55 12/11/2023    LDL 56.00 12/11/2023    TRIG 95 12/11/2023    TOTALCHOLEST 2 12/11/2023        Urine:  Lab Results   Component Value Date    APPEARANCEUA Clear 03/27/2023    SGUA >=1.040 (H) 03/27/2023    PROTEINUA 1+ (A) 03/27/2023    KETONESUA Negative 03/27/2023    LEUKOCYTESUR Negative 03/27/2023    RBCUA <5 03/27/2023    WBCUA <5 03/27/2023    BACTERIA None Seen 03/27/2023    CREATRANDUR 24.5 (L) 01/02/2025        Assessment:       ICD-10-CM ICD-9-CM   1. Foot callus  L84 700   2. Anxiety  F41.9 300.00   3. Erectile dysfunction, unspecified erectile dysfunction type  N52.9 607.84        Plan:     1. Foot callus  Comments:  Callus on left 2nd toe.  Green toenails from pond die.  No sign of infection.  We will send to podiatry per patient request  Orders:  -     Ambulatory referral/consult to Podiatry; Future; Expected date: 03/31/2025    2. Anxiety  Overview:  Continue with Xanax 0.5 mg nightly PRN anxiety.  Practice deep breathing or abdominal breathing exercises when anxiety occurs.  Exercise daily. Get sunlight daily.  Avoid caffeine, alcohol and stimulants.  Practice positive phrases and repeat throughout the day, along with yoga and relaxation techniques.  Set healthy boundaries, avoid people and conversations that increase stress.  Educated  patient on the risks of serotonin based medications such as serotonin modulators and SSRIs/SNRIs including common side effects of nausea, GI upset, headache dizziness as well as the rare risk for worsening symptoms of depression including development of suicidal thoughts or ideations, and serotonin syndrome.   Discussed benefits of medication not becoming noticeable until up to 6 weeks from start date.   Reports any symptoms of suicidal or homicidal ideations immediately, if clinic is closed go to nearest emergency room.  Discussed possibility of using benzodiazepines    Orders:  -     ALPRAZolam (XANAX) 0.5 MG tablet; Take 1 tablet (0.5 mg total) by mouth nightly as needed for Anxiety.  Dispense: 30 tablet; Refill: 2    3. Erectile dysfunction, unspecified erectile dysfunction type  Overview:  Continue with Cialis 10 mg daily PRN Erectile Dysfunction, no more than 2 a week.    Orders:  -     tadalafiL (CIALIS) 10 MG tablet; Take 1 tablet (10 mg total) by mouth daily as needed for Erectile Dysfunction.  Dispense: 5 tablet; Refill: 2         No follow-ups on file. In addition to their scheduled follow up, the patient has also been instructed to follow up on as needed basis.     Future Appointments   Date Time Provider Department Center   4/1/2025  9:00 AM LAB, [facility] FAMILY MED NILE Wheeler   4/3/2025 11:20 AM PROVIDER, [facility] FAMILY [facility]ICINE ANDRÉS Adam MD

## 2025-04-01 PROCEDURE — 85025 COMPLETE CBC W/AUTO DIFF WBC: CPT

## 2025-04-01 PROCEDURE — 80053 COMPREHEN METABOLIC PANEL: CPT | Performed by: FAMILY MEDICINE

## 2025-04-01 PROCEDURE — 80061 LIPID PANEL: CPT | Performed by: FAMILY MEDICINE

## 2025-04-01 PROCEDURE — 83550 IRON BINDING TEST: CPT | Performed by: FAMILY MEDICINE

## 2025-04-01 PROCEDURE — 83036 HEMOGLOBIN GLYCOSYLATED A1C: CPT

## 2025-04-01 PROCEDURE — 84443 ASSAY THYROID STIM HORMONE: CPT | Performed by: FAMILY MEDICINE

## 2025-04-02 ENCOUNTER — RESULTS FOLLOW-UP (OUTPATIENT)
Dept: FAMILY MEDICINE | Facility: CLINIC | Age: 85
End: 2025-04-02

## 2025-04-03 ENCOUNTER — OFFICE VISIT (OUTPATIENT)
Dept: FAMILY MEDICINE | Facility: CLINIC | Age: 85
End: 2025-04-03
Payer: MEDICARE

## 2025-04-03 VITALS
WEIGHT: 192 LBS | SYSTOLIC BLOOD PRESSURE: 124 MMHG | HEIGHT: 70 IN | BODY MASS INDEX: 27.49 KG/M2 | DIASTOLIC BLOOD PRESSURE: 75 MMHG | OXYGEN SATURATION: 95 % | HEART RATE: 86 BPM | TEMPERATURE: 98 F | RESPIRATION RATE: 18 BRPM

## 2025-04-03 DIAGNOSIS — E11.9 TYPE 2 DIABETES MELLITUS WITHOUT COMPLICATION, WITHOUT LONG-TERM CURRENT USE OF INSULIN: ICD-10-CM

## 2025-04-03 DIAGNOSIS — D62 ANEMIA ASSOCIATED WITH ACUTE BLOOD LOSS: ICD-10-CM

## 2025-04-03 DIAGNOSIS — E78.2 MIXED HYPERLIPIDEMIA: Primary | ICD-10-CM

## 2025-04-03 PROCEDURE — 99214 OFFICE O/P EST MOD 30 MIN: CPT | Mod: ,,,

## 2025-04-03 NOTE — ASSESSMENT & PLAN NOTE
Due to patient's age and chronic conditions, his A1c goal is 7.5-8.  He is at this goal.  Continue ADA diet

## 2025-04-03 NOTE — PROGRESS NOTES
FAMILY MEDICINE Clinic  Alivia Muñoz MD    Patient ID: [id number]     Chief Complaint: Results      HPI:     Rico Nagel is a 84 y.o. male here today for follow up of chronic conditions.    LDL 49  A1c 7.8  Hemoglobin 13.6, .2.  Patient has a history of folate deficiency.  Normal folate and B12 1 year ago.  Normal iron studies this visit.    He has an appointment to establish care with Dr. Mora in [place] coming up.      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    Restless leg syndrome     Sleep apnea     does not use CPAP since recall    Unspecified glaucoma     Unspecified macular degeneration 2021    Left Eye   Dr Brielle Harvey    Vitamin D deficiency         Past Surgical History:   Procedure Laterality Date    CARDIOVERSION  08/08/2022    CLOSURE OF LEFT ATRIAL APPENDAGE USING DEVICE N/A 03/28/2023    Procedure: Left atrial appendage closure device;  Surgeon: Ayo Colon MD;  Location: [facility] CATH LAB;  Service: Cardiology;  Laterality: N/A;  AMULET W/ INTRA OP DELORIS    COLON SURGERY      due to internal bleeding    COLONOSCOPY  11/12/2024    OLOL    CORONARY ANGIOPLASTY WITH STENT PLACEMENT N/A 01/2015    LO to LAD & BMS to RPLA    CYSTOSCOPY W/ STONE MANIPULATION N/A     ECHOCARDIOGRAM, TRANSESOPHAGEAL  05/04/2023    Dr. Colon    ECHOCARDIOGRAM,TRANSESOPHAGEAL N/A 03/28/2023    Procedure: Transesophageal echo (DELORIS) intra-procedure log documentation;  Surgeon:  Dosc Diagnostic Provider;  Location: [facility] CATH LAB;  Service: Cardiology;  Laterality: N/A;    EGD, WITH CLOSED BIOPSY  12/11/2024    Procedure: EGD, WITH CLOSED BIOPSY;  Surgeon: Juma Hooker MD;  Location: [facility] OR;  Service: Gastroenterology;;    ESOPHAGOGASTRODUODENOSCOPY  08/10/2022    EXTERNAL EAR SURGERY Left 11/2023    EYE SURGERY Bilateral     Cataract & Glaucoma    KIDNEY STONE SURGERY      KNEE ARTHROPLASTY Left     LEFT HEART CATHETERIZATION Left 06/26/2023    Procedure: Left heart cath;  Surgeon: Laura Johnson MD;  Location: [facility] CATH LAB;  Service: Cardiology;  Laterality: Left;  LHC VIA RT GROIN    PERCUTANEOUS NEPHROLITHOTOMY      ULTRASOUND, ENDOSCOPIC, WITH FINE NEEDLE ASPIRATION N/A 12/11/2024    Procedure: UPPER EUS W/ POSS FNA;  Surgeon: Juma Hooker MD;  Location: [facility];  Service: Gastroenterology;  Laterality: N/A;        Social History     Tobacco Use    Smoking status: Some Days     Types: Cigars    Smokeless tobacco: Never   Substance and Sexual Activity    Alcohol use: Yes     Alcohol/week: 4.0 standard drinks of alcohol     Types: 4 Shots of liquor per week     Comment: 3x week (not lately)    Drug use: Never    Sexual activity: Not Currently        Current Outpatient Medications   Medication Instructions    ALPRAZolam (XANAX) 0.5 mg, Oral, Nightly PRN    aspirin (ECOTRIN) 325 mg, Daily    atorvastatin (LIPITOR) 20 mg, Nightly    blood sugar diagnostic (TRUE METRIX GLUCOSE TEST STRIP) Strp Use one strip to check cbg once daily    CONTOUR NEXT TEST STRIPS Strp USE TO TEST BLOOD GLUCOSE ONCE A DAY    ENTRESTO 24-26 mg per tablet 1 tablet, 2 times daily    ferrous sulfate 325 mg, Oral, Daily    lancets 30 gauge Misc 1 lancet , Misc.(Non-Drug; Combo Route), Daily    magnesium gluconate 27.5 mg magne- sium (500 mg) Tab 1 tablet, Daily    metoprolol succinate (TOPROL-XL) 12.5 mg, 2 times daily    sd-ra-XK-vit N-jmjbu-ggku-zeax (OCUVITE EYE PLUS MULTI) 200- mcg Tab Ocuvite Eye  "Plus Multi 200 mcg-15 mcg-150 mcg tablet, [RxNorm: 0]    mv-mn/om3/dha/epa/fish/lut/nasima (OCUVITE ADULT 50 PLUS ORAL) 1 tablet, Nightly    pantoprazole (PROTONIX) 40 mg, Oral, Daily    pramipexole (MIRAPEX) 0.5-1 mg, Oral, Nightly    tadalafiL (CIALIS) 10 mg, Oral, Daily PRN    ZIOPTAN, PF, 0.0015 % Dpet 1 drop, Nightly       Review of patient's allergies indicates:   Allergen Reactions    Eliquis [apixaban] Other (See Comments)     GI Bleed; severe bleeding from nose & mouth        Patient Care Team:  Leon Vaughan Sr., MD as PCP - General (Family Medicine)  Laura Johnson MD as Consulting Physician (Cardiology)  Ayo Colon MD as Consulting Physician (Cardiology)  Brielle Phelps MD as Consulting Physician (Ophthalmology)  James Landin MD (Otolaryngology)  Arden Harvey MD as Consulting Physician (Ophthalmology)     Subjective:     Review of Systems    12 point review of systems conducted, negative except as stated in the history of present illness. See HPI for details.    Objective:     Visit Vitals  /75   Pulse 86   Temp 98.2 °F (36.8 °C) (Skin)   Resp 18   Ht 5' 10" (1.778 m)   Wt 87.1 kg (192 lb)   SpO2 95%   BMI 27.55 kg/m²       Physical Exam  Vitals and nursing note reviewed.   Constitutional:       General: He is not in acute distress.     Appearance: Normal appearance. He is not ill-appearing or diaphoretic.   HENT:      Head: Normocephalic and atraumatic.      Mouth/Throat:      Mouth: Mucous membranes are moist.      Pharynx: Oropharynx is clear.   Eyes:      Extraocular Movements: Extraocular movements intact.      Conjunctiva/sclera: Conjunctivae normal.   Cardiovascular:      Rate and Rhythm: Normal rate and regular rhythm.      Pulses: Normal pulses.      Heart sounds: No murmur heard.  Pulmonary:      Effort: Pulmonary effort is normal. No respiratory distress.      Breath sounds: Normal breath sounds. No wheezing, rhonchi or rales.   Musculoskeletal:      Right lower " leg: Edema (1+ ankle) present.      Left lower leg: Edema (1+ ankle) present.   Skin:     General: Skin is warm and dry.   Neurological:      General: No focal deficit present.      Mental Status: He is alert and oriented to person, place, and time. Mental status is at baseline.   Psychiatric:         Mood and Affect: Mood normal.         Labs Reviewed:     Chemistry:  Lab Results   Component Value Date     04/01/2025    K 4.1 04/01/2025    BUN 29.9 (H) 04/01/2025    CREATININE 0.80 04/01/2025    EGFRNORACEVR >60 04/01/2025    GLUCOSE 124 (H) 04/01/2025    CALCIUM 8.6 (L) 04/01/2025    ALKPHOS 98 04/01/2025    LABPROT 6.7 04/01/2025    ALBUMIN 3.5 04/01/2025    BILIDIR 0.5 (H) 06/13/2023    IBILI 1.00 (H) 06/13/2023    AST 22 04/01/2025    ALT 27 04/01/2025    MG 2.10 04/05/2023    [id number] 53.6 06/13/2023    TSH 1.988 04/01/2025        Lab Results   Component Value Date    HGBA1C 7.8 (H) 04/01/2025        Hematology:  Lab Results   Component Value Date    WBC 7.43 04/01/2025    HGB 13.6 (L) 04/01/2025    HCT 43.9 04/01/2025     04/01/2025       Lipid Panel:  Lab Results   Component Value Date    CHOL 109 04/01/2025    HDL 48 04/01/2025    LDL 49.00 (L) 04/01/2025    TRIG 60 04/01/2025    TOTALCHOLEST 2 04/01/2025        Urine:  Lab Results   Component Value Date    APPEARANCEUA Clear 03/27/2023    SGUA >=1.040 (H) 03/27/2023    PROTEINUA 1+ (A) 03/27/2023    KETONESUA Negative 03/27/2023    LEUKOCYTESUR Negative 03/27/2023    RBCUA <5 03/27/2023    WBCUA <5 03/27/2023    BACTERIA None Seen 03/27/2023    CREATRANDUR 24.5 (L) 01/02/2025        Assessment:       ICD-10-CM ICD-9-CM   1. Mixed hyperlipidemia  E78.2 272.2   2. Anemia associated with acute blood loss  D62 285.1   3. Type 2 diabetes mellitus without complication, without long-term current use of insulin  E11.9 250.00        Plan:     1. Mixed hyperlipidemia  Overview:  Hyperlipidemia Medications               atorvastatin (LIPITOR) 20 MG  tablet Take 20 mg by mouth every evening.        Followed by Cardiology.  Stressed importance of dietary modifications. Follow a low cholesterol, low saturated fat diet with less that 200mg of cholesterol a day.  Avoid fried foods and high saturated fats (high saturated fats less than 7% of calories).  Add Flax Seed/Fish Oil supplements to diet. Increase dietary fiber.  Regular exercise can reduce LDL and raise HDL. Stressed importance of physical activity 5 times per week for 30 minutes per day.     Assessment & Plan:  LDL 49.  At goal.  Continue above regimen.      2. Anemia associated with acute blood loss  Overview:  Patient was admitted at [facility] from 11/11-11/14/2024 for an upper GI bleed after presenting to the ER with melena and coffee-ground emesis.  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  No active bleeding found on EGD.  GI recommended an outpatient esophageal ultrasound for further investigation of the submucosal lesion in the stomach which is scheduled for December 11th.  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  A capsule study was also done, and results are pending.  Patient was discharged on Protonix 40 mg b.i.d.     Assessment & Plan:  Hemoglobin improve to 13.6, .2.  Patient has a history of folate deficiency.  Normal folate and B12 1 year ago.  Normal iron studies this visit.  Patient has stopped taking his iron because he was worried he would not be able to identify a GI bleed due to black stools.  Hold off on iron for now.  Patient will need monitoring with CBC every 3 months      3. Type 2 diabetes mellitus without complication, without long-term current use of insulin  Overview:  Lab  Results   Component Value Date    HGBA1C 7.8 (H) 04/01/2025    HGBA1C 6.3 06/03/2024    LDL 49.00 (L) 04/01/2025    CREATININE 0.80 04/01/2025    CREATININE 0.81 11/13/2024        On ACE and Statin according to guidelines.  Discussed caution with SGLT2s + diuretics as concomitant use can cause volume depletion. Discussed caution with DPP-Ivs and HF risk.  Follow ADA Diet. Avoid soda, sweets, and limit carbs (no more than 45-50 grams per meal).  Maintain healthy weight with goal BMI <30.  Exercise 5 times per week for 30 minutes per day.  Stressed importance of daily foot exams.          Assessment & Plan:  Due to patient's age and chronic conditions, his A1c goal is 7.5-8.  He is at this goal.  Continue ADA diet           No follow-ups on file. In addition to their scheduled follow up, the patient has also been instructed to follow up on as needed basis.     No future appointments.     Alivia Muñoz MD

## 2025-04-03 NOTE — ASSESSMENT & PLAN NOTE
Hemoglobin improve to 13.6, .2.  Patient has a history of folate deficiency.  Normal folate and B12 1 year ago.  Normal iron studies this visit.  Patient has stopped taking his iron because he was worried he would not be able to identify a GI bleed due to black stools.  Hold off on iron for now.  Patient will need monitoring with CBC every 3 months

## (undated) DEVICE — COLLECTION SPECIMEN NEPTUNE

## (undated) DEVICE — PAD DEFIB CADENCE ADULT R2

## (undated) DEVICE — TIP SUCTION YANKAUER

## (undated) DEVICE — ELECTRODE REM PLYHSV RETURN 9

## (undated) DEVICE — LEFT ATRIAL APPENDAGE OCCLUDER
Type: IMPLANTABLE DEVICE | Site: HEART | Status: NON-FUNCTIONAL
Brand: AMPLATZER™ AMULET™
Removed: 2023-03-28

## (undated) DEVICE — Device

## (undated) DEVICE — KIT COPILOT VALVE HEMO TOOL

## (undated) DEVICE — UNDERPAD PROTECT PLUS 17X24IN

## (undated) DEVICE — SYR 50CC LL

## (undated) DEVICE — INTRODUCER SWARTZ SL0 8.5FR

## (undated) DEVICE — BAG LABGUARD BIOHAZARD 6X9IN

## (undated) DEVICE — WIRE PGTL FLX SPRL  .025X230CM

## (undated) DEVICE — KIT CANIST SUCTION 1200CC

## (undated) DEVICE — FORCEP ALLIGATOR 2.8MM W/NDL

## (undated) DEVICE — DILATOR RADIOPAQUE JCD 12.0F

## (undated) DEVICE — CONTAINER SPECIMEN SCREW 4OZ

## (undated) DEVICE — CANNULA NASAL ADULT

## (undated) DEVICE — COVER PROBE US 5.5X58L NON LTX

## (undated) DEVICE — SET ANGIO ACIST CVI ANGIOTOUCH

## (undated) DEVICE — GUIDEWIRE INQWIRE SE 3MM JTIP

## (undated) DEVICE — KIT MINI STK MAX COAX 5FR 10CM

## (undated) DEVICE — DRESSING TRANS 4X4 TEGADERM

## (undated) DEVICE — NDL NRG TRNSPTAL 71CM

## (undated) DEVICE — CATH IMPULSE FL5 5FR

## (undated) DEVICE — SHEATH INTRODUCER 5FR 10CM

## (undated) DEVICE — SHEATH DEL TORQVUE 12FR 8OCM

## (undated) DEVICE — SOL IRRI STRL WATER 1000ML

## (undated) DEVICE — PAD DEFIB RADIOLUCENT ADULT

## (undated) DEVICE — SUT ETHBND XTRA 1 OS-8 30IN

## (undated) DEVICE — BLLN ULTRASONIC LINEAR FLEX

## (undated) DEVICE — STOPCOCK 3-WAY MED RA-RIGHT

## (undated) DEVICE — DILATOR VESSELL 8.0-38

## (undated) DEVICE — DILATOR VESSEL 10FR X 20CM

## (undated) DEVICE — CATH INFINITI MULTIPAK JR4 5FR

## (undated) DEVICE — KIT SURGICAL COLON .25 1.1OZ

## (undated) DEVICE — SET INTRO PERFRMR SHTH 16FR

## (undated) DEVICE — DRESSING TEGADERM 4.4X5IN

## (undated) DEVICE — CATH IMPULSE 5FR PIGTAIL 125CM

## (undated) DEVICE — TUBING O2 FEMALE CONN 13FT

## (undated) DEVICE — CATH AL1 5FR